# Patient Record
Sex: MALE | Race: WHITE | Employment: FULL TIME | URBAN - METROPOLITAN AREA
[De-identification: names, ages, dates, MRNs, and addresses within clinical notes are randomized per-mention and may not be internally consistent; named-entity substitution may affect disease eponyms.]

---

## 2017-03-10 PROBLEM — E29.1 HYPOGONADISM IN MALE: Status: ACTIVE | Noted: 2017-03-10

## 2017-03-28 PROBLEM — R68.82 DIMINISHED LIBIDO: Status: ACTIVE | Noted: 2017-03-28

## 2017-08-22 PROBLEM — R68.82 DIMINISHED LIBIDO: Status: RESOLVED | Noted: 2017-03-28 | Resolved: 2017-08-22

## 2018-03-19 PROBLEM — N52.2 DRUG-INDUCED ERECTILE DYSFUNCTION: Status: ACTIVE | Noted: 2018-03-19

## 2018-09-26 PROBLEM — L81.9 COLOR CHANGE IN PIGMENTED SKIN LESION: Status: ACTIVE | Noted: 2018-09-26

## 2018-09-26 PROBLEM — R06.83 SNORING: Status: ACTIVE | Noted: 2018-09-26

## 2018-10-09 ENCOUNTER — HOSPITAL ENCOUNTER (OUTPATIENT)
Dept: SLEEP MEDICINE | Age: 43
Discharge: HOME | End: 2018-10-09
Payer: COMMERCIAL

## 2018-10-09 PROCEDURE — 95810 POLYSOM 6/> YRS 4/> PARAM: CPT

## 2019-03-19 PROBLEM — Z79.890 ENCOUNTER FOR MONITORING TESTOSTERONE REPLACEMENT THERAPY: Status: ACTIVE | Noted: 2019-03-19

## 2019-03-19 PROBLEM — Z51.81 ENCOUNTER FOR MONITORING TESTOSTERONE REPLACEMENT THERAPY: Status: ACTIVE | Noted: 2019-03-19

## 2019-04-30 PROBLEM — G47.33 OSA (OBSTRUCTIVE SLEEP APNEA): Status: ACTIVE | Noted: 2019-04-30

## 2019-04-30 PROBLEM — Z72.821 INADEQUATE SLEEP HYGIENE: Status: ACTIVE | Noted: 2019-04-30

## 2019-09-17 PROBLEM — L81.9 COLOR CHANGE IN PIGMENTED SKIN LESION: Status: RESOLVED | Noted: 2018-09-26 | Resolved: 2019-09-17

## 2019-09-17 PROBLEM — F10.90 HEAVY ALCOHOL CONSUMPTION: Status: ACTIVE | Noted: 2019-09-17

## 2019-09-21 PROBLEM — Z79.899 HIGH RISK MEDICATION USE: Status: ACTIVE | Noted: 2019-09-21

## 2019-12-13 PROBLEM — M25.522 LEFT ELBOW PAIN: Status: ACTIVE | Noted: 2019-12-13

## 2019-12-13 PROBLEM — Z79.899 HIGH RISK MEDICATION USE: Status: RESOLVED | Noted: 2019-09-21 | Resolved: 2019-12-13

## 2020-03-18 PROBLEM — Z28.21 REFUSED INFLUENZA VACCINE: Status: ACTIVE | Noted: 2020-03-18

## 2020-03-24 PROBLEM — Z13.1 SCREENING FOR DIABETES MELLITUS: Status: ACTIVE | Noted: 2020-03-24

## 2020-03-24 PROBLEM — M21.922: Status: ACTIVE | Noted: 2020-03-24

## 2020-04-23 PROBLEM — Z13.1 SCREENING FOR DIABETES MELLITUS: Status: RESOLVED | Noted: 2020-03-24 | Resolved: 2020-04-23

## 2020-07-14 RX ORDER — SODIUM CHLORIDE 0.9 % (FLUSH) 0.9 %
5-40 SYRINGE (ML) INJECTION AS NEEDED
Status: CANCELLED | OUTPATIENT
Start: 2020-07-14

## 2020-07-14 RX ORDER — SODIUM CHLORIDE 0.9 % (FLUSH) 0.9 %
5-40 SYRINGE (ML) INJECTION EVERY 8 HOURS
Status: CANCELLED | OUTPATIENT
Start: 2020-07-14

## 2020-07-18 PROBLEM — Z13.1 SCREENING FOR DIABETES MELLITUS: Status: RESOLVED | Noted: 2020-03-24 | Resolved: 2020-07-18

## 2020-07-20 NOTE — H&P
Date of Service: 2020-07-06  Work Status:  ????? Allergies:????? 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 He is bothered by contractures around his left elbow that prevents full flexion and full extension. He has had occasional catching sensation but on questioning it doesn't sound as though his had true locking. The elbow seems to get stiff at rest or at night. He had had a left both bone forearm fracture many years ago. He had ORIF of the.    PE: The patient has decreased range of motion on the left as compared to the right. He has measured range from 30  to 125  P/S a 75/90. CT scan: The patient had a CT done at [address]  on 6/26/20. I've reviewed the images by Internet as well as the radiologist written report. It shows the patient to have degenerative changes of the elbow with hypertrophy of the anterior and posterior aspects of the trochlea as well as a prominent olecranon osteophyte with fragmentation of this. There is also hypertrophy of the coronoid. The joint cartilages are in good condition. There is soft tissue tension thickening around the extensor soft tissues near the olecranon but no definite bursitis. No loose bodies were noted. Disposition: The problem is discussed with the patient. He wishes to go ahead with surgery that has been scheduled for a 7/17/20. We'll began with inspection of the anterior part of the joint probably shortening the tip of the coronoid and doing a capsulotomy. If this does not give enough for flexion or extension and then he'll need exam of the posterior part of the elbow possibly through the same incision or possibly a secondary incision. He understands that the goal is to obtain about 50% improvement.   Arthrotomy was made some therapy and probably a dynamic splint afterwards. He wishes to proceed.

## 2020-07-23 ENCOUNTER — ANESTHESIA EVENT (OUTPATIENT)
Dept: SURGERY | Age: 45
End: 2020-07-23
Payer: COMMERCIAL

## 2020-07-24 ENCOUNTER — ANESTHESIA (OUTPATIENT)
Dept: SURGERY | Age: 45
End: 2020-07-24
Payer: COMMERCIAL

## 2020-07-24 ENCOUNTER — HOSPITAL ENCOUNTER (OUTPATIENT)
Age: 45
Setting detail: OUTPATIENT SURGERY
Discharge: HOME | End: 2020-07-24
Attending: ORTHOPAEDIC SURGERY | Admitting: ORTHOPAEDIC SURGERY
Payer: COMMERCIAL

## 2020-07-24 VITALS
SYSTOLIC BLOOD PRESSURE: 136 MMHG | WEIGHT: 219.8 LBS | RESPIRATION RATE: 14 BRPM | BODY MASS INDEX: 30.77 KG/M2 | TEMPERATURE: 97.8 F | HEART RATE: 80 BPM | DIASTOLIC BLOOD PRESSURE: 77 MMHG | HEIGHT: 71 IN | OXYGEN SATURATION: 94 %

## 2020-07-24 DIAGNOSIS — G89.18 POST-OP PAIN: Primary | ICD-10-CM

## 2020-07-24 PROCEDURE — 74011250636 HC RX REV CODE- 250/636: Performed by: NURSE ANESTHETIST, CERTIFIED REGISTERED

## 2020-07-24 PROCEDURE — 77030018836 HC SOL IRR NACL ICUM -A: Performed by: ORTHOPAEDIC SURGERY

## 2020-07-24 PROCEDURE — 77030000032 HC CUF TRNQT ZIMM -B: Performed by: ORTHOPAEDIC SURGERY

## 2020-07-24 PROCEDURE — 76942 ECHO GUIDE FOR BIOPSY: CPT | Performed by: ORTHOPAEDIC SURGERY

## 2020-07-24 PROCEDURE — 76060000034 HC ANESTHESIA 1.5 TO 2 HR: Performed by: ORTHOPAEDIC SURGERY

## 2020-07-24 PROCEDURE — 74011250636 HC RX REV CODE- 250/636

## 2020-07-24 PROCEDURE — 77030031139 HC SUT VCRL2 J&J -A: Performed by: ORTHOPAEDIC SURGERY

## 2020-07-24 PROCEDURE — 77030040361 HC SLV COMPR DVT MDII -B: Performed by: ORTHOPAEDIC SURGERY

## 2020-07-24 PROCEDURE — 77030002933 HC SUT MCRYL J&J -A: Performed by: ORTHOPAEDIC SURGERY

## 2020-07-24 PROCEDURE — 77030003602 HC NDL NRV BLK BBMI -B: Performed by: ANESTHESIOLOGY

## 2020-07-24 PROCEDURE — 74011000250 HC RX REV CODE- 250: Performed by: NURSE ANESTHETIST, CERTIFIED REGISTERED

## 2020-07-24 PROCEDURE — 77030002966 HC SUT PDS J&J -A: Performed by: ORTHOPAEDIC SURGERY

## 2020-07-24 PROCEDURE — 77030010509 HC AIRWY LMA MSK TELE -A: Performed by: ANESTHESIOLOGY

## 2020-07-24 PROCEDURE — 76210000006 HC OR PH I REC 0.5 TO 1 HR: Performed by: ORTHOPAEDIC SURGERY

## 2020-07-24 PROCEDURE — 74011250636 HC RX REV CODE- 250/636: Performed by: ANESTHESIOLOGY

## 2020-07-24 PROCEDURE — 76010000149 HC OR TIME 1 TO 1.5 HR: Performed by: ORTHOPAEDIC SURGERY

## 2020-07-24 PROCEDURE — 76010010054 HC POST OP PAIN BLOCK: Performed by: ORTHOPAEDIC SURGERY

## 2020-07-24 PROCEDURE — 76210000020 HC REC RM PH II FIRST 0.5 HR: Performed by: ORTHOPAEDIC SURGERY

## 2020-07-24 PROCEDURE — 74011000250 HC RX REV CODE- 250: Performed by: ANESTHESIOLOGY

## 2020-07-24 RX ORDER — BUPIVACAINE HYDROCHLORIDE AND EPINEPHRINE 5; 5 MG/ML; UG/ML
INJECTION, SOLUTION EPIDURAL; INTRACAUDAL; PERINEURAL
Status: COMPLETED | OUTPATIENT
Start: 2020-07-24 | End: 2020-07-24

## 2020-07-24 RX ORDER — ONDANSETRON 8 MG/1
8 TABLET, ORALLY DISINTEGRATING ORAL
Qty: 12 TAB | Refills: 1 | Status: SHIPPED | OUTPATIENT
Start: 2020-07-24 | End: 2021-08-11 | Stop reason: ALTCHOICE

## 2020-07-24 RX ORDER — FENTANYL CITRATE 50 UG/ML
100 INJECTION, SOLUTION INTRAMUSCULAR; INTRAVENOUS ONCE
Status: DISCONTINUED | OUTPATIENT
Start: 2020-07-24 | End: 2020-07-24 | Stop reason: HOSPADM

## 2020-07-24 RX ORDER — ONDANSETRON 2 MG/ML
INJECTION INTRAMUSCULAR; INTRAVENOUS AS NEEDED
Status: DISCONTINUED | OUTPATIENT
Start: 2020-07-24 | End: 2020-07-24 | Stop reason: HOSPADM

## 2020-07-24 RX ORDER — LIDOCAINE HYDROCHLORIDE 20 MG/ML
INJECTION, SOLUTION EPIDURAL; INFILTRATION; INTRACAUDAL; PERINEURAL AS NEEDED
Status: DISCONTINUED | OUTPATIENT
Start: 2020-07-24 | End: 2020-07-24 | Stop reason: HOSPADM

## 2020-07-24 RX ORDER — OXYCODONE HYDROCHLORIDE 5 MG/1
10 TABLET ORAL
Status: DISCONTINUED | OUTPATIENT
Start: 2020-07-24 | End: 2020-07-24 | Stop reason: HOSPADM

## 2020-07-24 RX ORDER — MIDAZOLAM HYDROCHLORIDE 1 MG/ML
2 INJECTION, SOLUTION INTRAMUSCULAR; INTRAVENOUS
Status: DISCONTINUED | OUTPATIENT
Start: 2020-07-24 | End: 2020-07-24 | Stop reason: HOSPADM

## 2020-07-24 RX ORDER — NALOXONE HYDROCHLORIDE 0.4 MG/ML
0.1 INJECTION, SOLUTION INTRAMUSCULAR; INTRAVENOUS; SUBCUTANEOUS AS NEEDED
Status: DISCONTINUED | OUTPATIENT
Start: 2020-07-24 | End: 2020-07-24 | Stop reason: HOSPADM

## 2020-07-24 RX ORDER — SODIUM CHLORIDE, SODIUM LACTATE, POTASSIUM CHLORIDE, CALCIUM CHLORIDE 600; 310; 30; 20 MG/100ML; MG/100ML; MG/100ML; MG/100ML
100 INJECTION, SOLUTION INTRAVENOUS CONTINUOUS
Status: DISCONTINUED | OUTPATIENT
Start: 2020-07-24 | End: 2020-07-24 | Stop reason: HOSPADM

## 2020-07-24 RX ORDER — LIDOCAINE HYDROCHLORIDE 10 MG/ML
0.1 INJECTION INFILTRATION; PERINEURAL AS NEEDED
Status: DISCONTINUED | OUTPATIENT
Start: 2020-07-24 | End: 2020-07-24 | Stop reason: HOSPADM

## 2020-07-24 RX ORDER — DEXAMETHASONE SODIUM PHOSPHATE 4 MG/ML
INJECTION, SOLUTION INTRA-ARTICULAR; INTRALESIONAL; INTRAMUSCULAR; INTRAVENOUS; SOFT TISSUE AS NEEDED
Status: DISCONTINUED | OUTPATIENT
Start: 2020-07-24 | End: 2020-07-24 | Stop reason: HOSPADM

## 2020-07-24 RX ORDER — SODIUM CHLORIDE 0.9 % (FLUSH) 0.9 %
5-40 SYRINGE (ML) INJECTION AS NEEDED
Status: DISCONTINUED | OUTPATIENT
Start: 2020-07-24 | End: 2020-07-24 | Stop reason: HOSPADM

## 2020-07-24 RX ORDER — ROPIVACAINE HYDROCHLORIDE 5 MG/ML
INJECTION, SOLUTION EPIDURAL; INFILTRATION; PERINEURAL
Status: COMPLETED | OUTPATIENT
Start: 2020-07-24 | End: 2020-07-24

## 2020-07-24 RX ORDER — DIPHENHYDRAMINE HYDROCHLORIDE 50 MG/ML
12.5 INJECTION, SOLUTION INTRAMUSCULAR; INTRAVENOUS
Status: DISCONTINUED | OUTPATIENT
Start: 2020-07-24 | End: 2020-07-24 | Stop reason: HOSPADM

## 2020-07-24 RX ORDER — OXYCODONE HYDROCHLORIDE 5 MG/1
5 TABLET ORAL
Status: DISCONTINUED | OUTPATIENT
Start: 2020-07-24 | End: 2020-07-24 | Stop reason: HOSPADM

## 2020-07-24 RX ORDER — SODIUM CHLORIDE 0.9 % (FLUSH) 0.9 %
5-40 SYRINGE (ML) INJECTION EVERY 8 HOURS
Status: DISCONTINUED | OUTPATIENT
Start: 2020-07-24 | End: 2020-07-24 | Stop reason: HOSPADM

## 2020-07-24 RX ORDER — KETOROLAC TROMETHAMINE 10 MG/1
10 TABLET, FILM COATED ORAL EVERY 6 HOURS
Qty: 15 TAB | Refills: 0 | Status: SHIPPED | OUTPATIENT
Start: 2020-07-24 | End: 2021-08-11 | Stop reason: ALTCHOICE

## 2020-07-24 RX ORDER — ONDANSETRON 2 MG/ML
4 INJECTION INTRAMUSCULAR; INTRAVENOUS ONCE
Status: DISCONTINUED | OUTPATIENT
Start: 2020-07-24 | End: 2020-07-24 | Stop reason: HOSPADM

## 2020-07-24 RX ORDER — PROPOFOL 10 MG/ML
INJECTION, EMULSION INTRAVENOUS AS NEEDED
Status: DISCONTINUED | OUTPATIENT
Start: 2020-07-24 | End: 2020-07-24 | Stop reason: HOSPADM

## 2020-07-24 RX ORDER — PROPOFOL 10 MG/ML
INJECTION, EMULSION INTRAVENOUS
Status: DISCONTINUED | OUTPATIENT
Start: 2020-07-24 | End: 2020-07-24 | Stop reason: HOSPADM

## 2020-07-24 RX ORDER — MIDAZOLAM HYDROCHLORIDE 1 MG/ML
2 INJECTION, SOLUTION INTRAMUSCULAR; INTRAVENOUS ONCE
Status: COMPLETED | OUTPATIENT
Start: 2020-07-24 | End: 2020-07-24

## 2020-07-24 RX ORDER — CEFAZOLIN SODIUM/WATER 2 G/20 ML
2 SYRINGE (ML) INTRAVENOUS ONCE
Status: COMPLETED | OUTPATIENT
Start: 2020-07-24 | End: 2020-07-24

## 2020-07-24 RX ORDER — ALBUTEROL SULFATE 0.83 MG/ML
2.5 SOLUTION RESPIRATORY (INHALATION) AS NEEDED
Status: DISCONTINUED | OUTPATIENT
Start: 2020-07-24 | End: 2020-07-24 | Stop reason: HOSPADM

## 2020-07-24 RX ORDER — HYDROMORPHONE HYDROCHLORIDE 2 MG/ML
0.5 INJECTION, SOLUTION INTRAMUSCULAR; INTRAVENOUS; SUBCUTANEOUS
Status: DISCONTINUED | OUTPATIENT
Start: 2020-07-24 | End: 2020-07-24 | Stop reason: HOSPADM

## 2020-07-24 RX ORDER — HYDROMORPHONE HYDROCHLORIDE 2 MG/1
2 TABLET ORAL
Qty: 30 TAB | Refills: 0 | Status: SHIPPED | OUTPATIENT
Start: 2020-07-24 | End: 2020-07-29

## 2020-07-24 RX ADMIN — Medication 2 G: at 12:43

## 2020-07-24 RX ADMIN — ROPIVACAINE HYDROCHLORIDE 10 ML: 150 INJECTION, SOLUTION EPIDURAL; INFILTRATION; PERINEURAL at 11:30

## 2020-07-24 RX ADMIN — MIDAZOLAM 2 MG: 1 INJECTION INTRAMUSCULAR; INTRAVENOUS at 11:26

## 2020-07-24 RX ADMIN — ONDANSETRON 4 MG: 2 INJECTION INTRAMUSCULAR; INTRAVENOUS at 13:30

## 2020-07-24 RX ADMIN — PROPOFOL 300 MG: 10 INJECTION, EMULSION INTRAVENOUS at 12:58

## 2020-07-24 RX ADMIN — PROPOFOL 100 MCG/KG/MIN: 10 INJECTION, EMULSION INTRAVENOUS at 12:49

## 2020-07-24 RX ADMIN — BUPIVACAINE HYDROCHLORIDE AND EPINEPHRINE BITARTRATE 20 ML: 5; .005 INJECTION, SOLUTION EPIDURAL; INTRACAUDAL; PERINEURAL at 11:30

## 2020-07-24 RX ADMIN — LIDOCAINE HYDROCHLORIDE 30 MG: 20 INJECTION, SOLUTION EPIDURAL; INFILTRATION; INTRACAUDAL; PERINEURAL at 12:47

## 2020-07-24 RX ADMIN — DEXAMETHASONE SODIUM PHOSPHATE 4 MG: 4 INJECTION, SOLUTION INTRAMUSCULAR; INTRAVENOUS at 13:28

## 2020-07-24 RX ADMIN — SODIUM CHLORIDE, SODIUM LACTATE, POTASSIUM CHLORIDE, AND CALCIUM CHLORIDE 100 ML/HR: 600; 310; 30; 20 INJECTION, SOLUTION INTRAVENOUS at 11:17

## 2020-07-24 RX ADMIN — PROPOFOL 40 MG: 10 INJECTION, EMULSION INTRAVENOUS at 12:47

## 2020-07-24 NOTE — BRIEF OP NOTE
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 Procedure(s):  LEFT ELBOW DEBRIDEMENT W. ANTERIOR AND POSTERIOR SHERIF DEBRIDEMENT/ EXCISION OLECRANON BURSA     Surgeon(s): Zina Larsen MD    Surgical Assistant: None    Anesthesia: Regional     Estimated Blood Loss (mL): less than 50     Complications: None    Specimens: * No specimens in log *     Implants: * No implants in log *    Drains: * No LDAs found *    Findings: Arthritis of the elbow with flexion contracture.     Electronically Signed by Carlene Morales MD on 7/24/2020 at 2:30 PM

## 2020-07-24 NOTE — DISCHARGE INSTRUCTIONS
POST OP INSTRUCTIONS    Has appt for 8/3/20 at 1:50 PM at the [facility] [phone]. Ice and elevate surgical extremity. May remove the Ace wraps in 48 hours, leave the Honey comb dressing on and shower. Movement: As tolerated. ACTIVITY  · As tolerated and as directed by your doctor. · Bathe or shower as directed by your doctor. DIET  · Clear liquids until no nausea or vomiting; then light diet for the first day. · Advance to regular diet on second day, unless your doctor orders otherwise. · If nausea and vomiting continues, call your doctor. PAIN  · Take pain medication as directed by your doctor. · Call your doctor if pain is NOT relieved by medication. · DO NOT take aspirin of blood thinners unless directed by your doctor. DRESSING CARE       CALL YOUR DOCTOR IF   · Excessive bleeding that does not stop after holding pressure over the area  · Temperature of 101 degrees F or above  · Excessive redness, swelling or bruising, and/ or green or yellow, smelly discharge from incision    AFTER ANESTHESIA   · For the first 24 hours: DO NOT Drive, Drink alcoholic beverages, or Make important decisions. · Be aware of dizziness following anesthesia and while taking pain medication. APPOINTMENT DATE/ TIME    YOUR DOCTOR'S PHONE NUMBER       DISCHARGE SUMMARY from Nurse    PATIENT INSTRUCTIONS:    After general anesthesia or intravenous sedation, for 24 hours or while taking prescription Narcotics:  · Limit your activities  · Do not drive and operate hazardous machinery  · Do not make important personal or business decisions  · Do  not drink alcoholic beverages  · If you have not urinated within 8 hours after discharge, please contact your surgeon on call. *  Please give a list of your current medications to your Primary Care Provider.     *  Please update this list whenever your medications are discontinued, doses are changed, or new medications (including over-the-counter products) are added. *  Please carry medication information at all times in case of emergency situations. These are general instructions for a healthy lifestyle:    No smoking/ No tobacco products/ Avoid exposure to second hand smoke    Surgeon General's Warning:  Quitting smoking now greatly reduces serious risk to your health. Obesity, smoking, and sedentary lifestyle greatly increases your risk for illness    A healthy diet, regular physical exercise & weight monitoring are important for maintaining a healthy lifestyle    You may be retaining fluid if you have a history of heart failure or if you experience any of the following symptoms:  Weight gain of 3 pounds or more overnight or 5 pounds in a week, increased swelling in our hands or feet or shortness of breath while lying flat in bed. Please call your doctor as soon as you notice any of these symptoms; do not wait until your next office visit. Recognize signs and symptoms of STROKE:    F-face looks uneven    A-arms unable to move or move unevenly    S-speech slurred or non-existent    T-time-call 911 as soon as signs and symptoms begin-DO NOT go       Back to bed or wait to see if you get better-TIME IS BRAIN. Advance Care Planning  People with COVID-19 may have no symptoms, mild symptoms, such as fever, cough, and shortness of breath or they may have more severe illness, developing severe and fatal pneumonia. As a result, Advance Care Planning with attention to naming a health care decision maker (someone you trust to make healthcare decisions for you if you could not speak for yourself) and sharing other health care preferences is important BEFORE a possible health crisis. Please contact your Primary Care Provider to discuss Advance Care Planning.      Preventing the Spread of Coronavirus Disease 2019 in Homes and Residential Communities  For the most recent information go to [employer]    Prevention steps for People with confirmed or suspected COVID-19 (including persons under investigation) who do not need to be hospitalized  and   People with confirmed COVID-19 who were hospitalized and determined to be medically stable to go home    Your healthcare provider and public health staff will evaluate whether you can be cared for at home. If it is determined that you do not need to be hospitalized and can be isolated at home, you will be monitored by staff from your local or state health department. You should follow the prevention steps below until a healthcare provider or local or state health department says you can return to your normal activities. Stay home except to get medical care  People who are mildly ill with COVID-19 are able to isolate at home during their illness. You should restrict activities outside your home, except for getting medical care. Do not go to work, school, or public areas. Avoid using public transportation, ride-sharing, or taxis. Separate yourself from other people and animals in your home  People: As much as possible, you should stay in a specific room and away from other people in your home. Also, you should use a separate bathroom, if available. Animals: You should restrict contact with pets and other animals while you are sick with COVID-19, just like you would around other people. Although there have not been reports of pets or other animals becoming sick with COVID-19, it is still recommended that people sick with COVID-19 limit contact with animals until more information is known about the virus. When possible, have another member of your household care for your animals while you are sick. If you are sick with COVID-19, avoid contact with your pet, including petting, snuggling, being kissed or licked, and sharing food.  If you must care for your pet or be around animals while you are sick, wash your hands before and after you interact with pets and wear a facemask. Call ahead before visiting your doctor  If you have a medical appointment, call the healthcare provider and tell them that you have or may have COVID-19. This will help the healthcare providers office take steps to keep other people from getting infected or exposed. Wear a facemask  You should wear a facemask when you are around other people (e.g., sharing a room or vehicle) or pets and before you enter a healthcare providers office. If you are not able to wear a facemask (for example, because it causes trouble breathing), then people who live with you should not stay in the same room with you, or they should wear a facemask if they enter your room. Cover your coughs and sneezes  Cover your mouth and nose with a tissue when you cough or sneeze. Throw used tissues in a lined trash can. Immediately wash your hands with soap and water for at least 20 seconds or, if soap and water are not available, clean your hands with an alcohol-based hand  that contains at least 60% alcohol. Clean your hands often  Wash your hands often with soap and water for at least 20 seconds, especially after blowing your nose, coughing, or sneezing; going to the bathroom; and before eating or preparing food. If soap and water are not readily available, use an alcohol-based hand  with at least 60% alcohol, covering all surfaces of your hands and rubbing them together until they feel dry. Soap and water are the best option if hands are visibly dirty. Avoid touching your eyes, nose, and mouth with unwashed hands. Avoid sharing personal household items  You should not share dishes, drinking glasses, cups, eating utensils, towels, or bedding with other people or pets in your home. After using these items, they should be washed thoroughly with soap and water.   Clean all high-touch surfaces everyday  High touch surfaces include counters, tabletops, doorknobs, bathroom fixtures, toilets, phones, keyboards, tablets, and bedside tables. Also, clean any surfaces that may have blood, stool, or body fluids on them. Use a household cleaning spray or wipe, according to the label instructions. Labels contain instructions for safe and effective use of the cleaning product including precautions you should take when applying the product, such as wearing gloves and making sure you have good ventilation during use of the product. Monitor your symptoms  Seek prompt medical attention if your illness is worsening (e.g., difficulty breathing). Before seeking care, call your healthcare provider and tell them that you have, or are being evaluated for, COVID-19. Put on a facemask before you enter the facility. These steps will help the healthcare providers office to keep other people in the office or waiting room from getting infected or exposed. Ask your healthcare provider to call the local or state health department. Persons who are placed under active monitoring or facilitated self-monitoring should follow instructions provided by their local health department or occupational health professionals, as appropriate. When working with your local health department check their available hours. If you have a medical emergency and need to call 911, notify the dispatch personnel that you have, or are being evaluated for COVID-19. If possible, put on a facemask before emergency medical services arrive. Discontinuing home isolation  Patients with confirmed COVID-19 should remain under home isolation precautions until the risk of secondary transmission to others is thought to be low. The decision to discontinue home isolation precautions should be made on a case-by-case basis, in consultation with healthcare providers and state and local health departments.

## 2020-07-24 NOTE — OP NOTES
[facility]  OPERATIVE REPORT    Name:  Harley Haider  MR#:  [id number]  :  1975  ACCOUNT #:  [de-identified]  DATE OF SERVICE:  2020    PREOPERATIVE DIAGNOSES:  1. Traumatic arthritis of the left elbow. 2.  Contractures of the left elbow. POSTOPERATIVE DIAGNOSES:  1. Traumatic arthritis of the left elbow. 2.  Contractures of the left elbow. PROCEDURE PERFORMED:  1. Arthrotomy and debridement of bone and synovium of the left elbow. 2.  Radical capsular excision and release of contractures of the left elbow. SURGEON:  Waldemar Ch MD    ASSISTANT:  None. ANESTHESIA:  Brachial plexus block. COMPLICATIONS:  None. SPECIMENS REMOVED:  None. IMPLANTS:  None. ESTIMATED BLOOD LOSS:  Less than 50 mL. PROCEDURE:  The patient had a preoperative range of motion from about 35 degrees to 125 degrees of the left elbow. He was given a brachial plexus block in the preop area and taken to the operating room and positioned in the supine position with his arm outstretched on an arm board. The patient had been given IV Ancef prophylactically. The left upper extremity was then prepped with ChloraPrep and draped as a sterile field. A time-out was held identifying the patient, surgeon, procedure, and operative site. An Esmarch bandage was used to exsanguinate the extremity of blood and a pneumatic tourniquet inflated to 250 mmHg around the upper arm. A dorsal lateral incision was made from just posterior to the lateral epicondyle across the anterolateral part of the elbow joint. The incision was carried down through the skin and subcutaneous tissues. Small bleeders were electrocoagulated. A split was made in the anterior third of the muscles of the conjoined tendon and carried down into the joint. When the joint capsule itself was opened, there was a gush of clear yellowish fluid that was suctioned out and dried.   No loose bodies were encountered during the procedure within the joint. Exam showed there to be thickening of the anterior capsule. This was released sharply by passing a [place]mont elevator both outside the joint as well as beneath the joint. The capsule was released. There was noted to be spurring around the condyles and a rongeur was used to remove some of this that prevented some full flexion, I believe, by impinging on the radial head and coronoid process. The end of the coronoid did not feel to be markedly elongated and was left alone. This allowed the patient to have almost full extension of the elbow at that point. Next, a posterior lateral incision was made along the lateral aspect of the triceps and carried down through skin and subcutaneous tissues. Again, small bleeders were electrocoagulated. Soft tissue was elevated back further posteriorly and then a longitudinal split made in the mid part of the triceps. An elevator and self-retaining retractors were used for visualization of the posterior aspect of the joint. There was some fibrotic debris in the olecranon fossa that was removed with a synovial rongeur. There was also some bone fragments in the distal triceps in the region of the longitudinal split that had been made and these were sharply excised. Some of the posterior olecranon bursa was also excised. This allowed better range of motion. I was able to completely extend the elbow to 0 and to flex to about 140-145 degrees of flexion. The joint was irrigated then with saline. The splits in the triceps as well as those in the conjoined tendon were repaired with 2-0 PDS. The subcutaneous layers of both incisions were closed with inverted sutures of 4-0 Monocryl. Steri-Strips were applied with Mastisol adhesive followed by a honeycomb dressing. Wrapping the end of Ace wraps from the palm to the upper arm was done for compression. The tourniquet was deflated. The patient tolerated the procedure well.       [address] [facility] 165, MD [id number]  D:  07/24/2020 14:42  T:  07/24/2020 15:12  JOB #:  [id number]  CC:  [address] [place] [address]

## 2020-07-24 NOTE — ANESTHESIA POSTPROCEDURE EVALUATION
Procedure(s):  LEFT ELBOW DEBRIDEMENT W. ANTERIOR AND POSTERIOR SHERIF DEBRIDEMENT/ EXCISION OLECRANON BURSAE BRACHIO PLEXUS.    total IV anesthesia    Anesthesia Post Evaluation      Multimodal analgesia: multimodal analgesia used between 6 hours prior to anesthesia start to PACU discharge  Patient location during evaluation: PACU  Patient participation: complete - patient participated  Level of consciousness: awake and alert  Pain score: 0  Pain management: adequate  Airway patency: patent  Anesthetic complications: no  Cardiovascular status: acceptable and hemodynamically stable  Respiratory status: acceptable  Hydration status: acceptable  Post anesthesia nausea and vomiting:  none  Final Post Anesthesia Temperature Assessment:  Normothermia (36.0-37.5 degrees C)      INITIAL Post-op Vital signs:   Vitals Value Taken Time   /87 7/24/2020  2:38 PM   Temp 36.6 °C (97.8 °F) 7/24/2020  2:16 PM   Pulse 80 7/24/2020  2:40 PM   Resp 16 7/24/2020  2:16 PM   SpO2 92 % 7/24/2020  2:40 PM   Vitals shown include unvalidated device data.

## 2020-07-24 NOTE — ANESTHESIA PROCEDURE NOTES
Peripheral Block    Start time: 7/24/2020 11:26 AM  End time: 7/24/2020 11:30 AM  Performed by: Diann Mar MD  Authorized by: Diann Mar MD       Pre-procedure:    Indications: at surgeon's request and post-op pain management    Preanesthetic Checklist: patient identified, risks and benefits discussed, site marked, timeout performed, anesthesia consent given and patient being monitored    Timeout Time: 11:26          Block Type:   Block Type:  Supraclavicular  Laterality:  Left  Monitoring:  Standard ASA monitoring, continuous pulse ox, frequent vital sign checks, heart rate, oxygen and responsive to questions  Injection Technique:  Single shot  Procedures: ultrasound guided    Patient Position: supine  Prep: chlorhexidine    Needle Type:  Stimuplex  Needle Gauge:  21 G  Needle Localization:  Ultrasound guidance and anatomical landmarks    Assessment:  Number of attempts:  1  Injection Assessment:  Incremental injection every 5 mL, local visualized surrounding nerve on ultrasound, negative aspiration for blood, no paresthesia, no intravascular symptoms and ultrasound image on chart  Patient tolerance:  Patient tolerated the procedure well with no immediate complications

## 2020-07-24 NOTE — INTERVAL H&P NOTE
Update History & Physical    The Patient's History and Physical of July 6, 2020,   was reviewed with the patient and I examined the patient. There was no change. The surgical site was confirmed by the patient and me. Pt is alert and oriented. Chest: Clear w/o SOB. C/V:  RR&R    Plan:  The risk, benefits, expected outcome, and alternative to the recommended procedure have been discussed with the patient. Patient understands and wants to proceed with debridement and contractor release of the left elbow.     Electronically signed by Phillip Turner MD on 7/24/2020 at 12:04 PM

## 2021-09-02 ENCOUNTER — HOSPITAL ENCOUNTER (OUTPATIENT)
Dept: MRI IMAGING | Age: 46
Discharge: HOME | End: 2021-09-02
Attending: INTERNAL MEDICINE
Payer: COMMERCIAL

## 2021-09-02 DIAGNOSIS — E23.0 HYPOGONADOTROPIC HYPOGONADISM (HCC): ICD-10-CM

## 2021-09-02 DIAGNOSIS — E22.1 HYPERPROLACTINEMIA (HCC): ICD-10-CM

## 2021-09-02 PROCEDURE — 74011250636 HC RX REV CODE- 250/636: Performed by: INTERNAL MEDICINE

## 2021-09-02 PROCEDURE — A9576 INJ PROHANCE MULTIPACK: HCPCS | Performed by: INTERNAL MEDICINE

## 2021-09-02 PROCEDURE — 70553 MRI BRAIN STEM W/O & W/DYE: CPT

## 2021-09-02 RX ORDER — SODIUM CHLORIDE 0.9 % (FLUSH) 0.9 %
10 SYRINGE (ML) INJECTION
Status: COMPLETED | OUTPATIENT
Start: 2021-09-02 | End: 2021-09-02

## 2021-09-02 RX ADMIN — GADOTERIDOL 20 ML: 279.3 INJECTION, SOLUTION INTRAVENOUS at 21:13

## 2021-09-02 RX ADMIN — Medication 10 ML: at 21:13

## 2022-03-18 PROBLEM — G47.33 OSA (OBSTRUCTIVE SLEEP APNEA): Status: ACTIVE | Noted: 2019-04-30

## 2022-03-18 PROBLEM — E29.1 HYPOGONADISM IN MALE: Status: ACTIVE | Noted: 2017-03-10

## 2022-03-18 PROBLEM — Z28.21 REFUSED INFLUENZA VACCINE: Status: ACTIVE | Noted: 2020-03-18

## 2022-03-19 PROBLEM — N52.2 DRUG-INDUCED ERECTILE DYSFUNCTION: Status: ACTIVE | Noted: 2018-03-19

## 2022-03-19 PROBLEM — Z72.821 INADEQUATE SLEEP HYGIENE: Status: ACTIVE | Noted: 2019-04-30

## 2022-03-19 PROBLEM — M25.522 LEFT ELBOW PAIN: Status: ACTIVE | Noted: 2019-12-13

## 2022-03-19 PROBLEM — F10.90 HEAVY ALCOHOL CONSUMPTION: Status: ACTIVE | Noted: 2019-09-17

## 2022-03-19 PROBLEM — Z79.899 HIGH RISK MEDICATION USE: Status: ACTIVE | Noted: 2019-09-21

## 2022-03-20 PROBLEM — Z79.890 ENCOUNTER FOR MONITORING TESTOSTERONE REPLACEMENT THERAPY: Status: ACTIVE | Noted: 2019-03-19

## 2022-03-20 PROBLEM — Z51.81 ENCOUNTER FOR MONITORING TESTOSTERONE REPLACEMENT THERAPY: Status: ACTIVE | Noted: 2019-03-19

## 2022-03-20 PROBLEM — M21.922: Status: ACTIVE | Noted: 2020-03-24

## 2022-03-20 PROBLEM — R06.83 SNORING: Status: ACTIVE | Noted: 2018-09-26

## 2022-05-27 DIAGNOSIS — E23.0 HYPOGONADOTROPIC HYPOGONADISM (HCC): Primary | ICD-10-CM

## 2022-06-13 ENCOUNTER — TELEPHONE (OUTPATIENT)
Dept: FAMILY MEDICINE CLINIC | Facility: CLINIC | Age: 47
End: 2022-06-13

## 2022-06-13 DIAGNOSIS — E34.9 TESTOSTERONE DEFICIENCY: ICD-10-CM

## 2022-06-13 DIAGNOSIS — Z13.220 SCREENING CHOLESTEROL LEVEL: ICD-10-CM

## 2022-06-13 DIAGNOSIS — Z13.1 SCREENING FOR DIABETES MELLITUS: Primary | ICD-10-CM

## 2022-06-13 NOTE — TELEPHONE ENCOUNTER
----- Message from Moira Adler sent at 6/13/2022  2:23 PM EDT -----  Subject: Message to Provider    QUESTIONS  Information for Provider? pt has a annual physical appt schd for [id number] @   [phone] please call pt if needs to come in prior to do labs or can do after   appt   ---------------------------------------------------------------------------  --------------  [address]  What is the best way for the office to contact you? OK to leave message on   voicemail  Preferred Call Back Phone Number? [phone]  ---------------------------------------------------------------------------  --------------  SCRIPT ANSWERS  Relationship to Patient?  Self

## 2022-06-17 ENCOUNTER — NURSE ONLY (OUTPATIENT)
Dept: FAMILY MEDICINE CLINIC | Facility: CLINIC | Age: 47
End: 2022-06-17

## 2022-06-17 DIAGNOSIS — Z13.1 SCREENING FOR DIABETES MELLITUS: ICD-10-CM

## 2022-06-17 DIAGNOSIS — Z13.220 SCREENING CHOLESTEROL LEVEL: ICD-10-CM

## 2022-06-17 DIAGNOSIS — E34.9 TESTOSTERONE DEFICIENCY: ICD-10-CM

## 2022-06-17 LAB
ALBUMIN SERPL-MCNC: 4.1 G/DL (ref 3.5–5)
ALBUMIN/GLOB SERPL: 1.2 {RATIO} (ref 1.2–3.5)
ALP SERPL-CCNC: 59 U/L (ref 50–136)
ALT SERPL-CCNC: 22 U/L (ref 12–65)
ANION GAP SERPL CALC-SCNC: 8 MMOL/L (ref 7–16)
AST SERPL-CCNC: 14 U/L (ref 15–37)
BASOPHILS # BLD: 0 K/UL (ref 0–0.2)
BASOPHILS NFR BLD: 1 % (ref 0–2)
BILIRUB SERPL-MCNC: 0.5 MG/DL (ref 0.2–1.1)
BUN SERPL-MCNC: 12 MG/DL (ref 6–23)
CALCIUM SERPL-MCNC: 9.8 MG/DL (ref 8.3–10.4)
CHLORIDE SERPL-SCNC: 106 MMOL/L (ref 98–107)
CHOLEST SERPL-MCNC: 274 MG/DL
CO2 SERPL-SCNC: 25 MMOL/L (ref 21–32)
CREAT SERPL-MCNC: 1.1 MG/DL (ref 0.8–1.5)
DIFFERENTIAL METHOD BLD: NORMAL
EOSINOPHIL # BLD: 0.2 K/UL (ref 0–0.8)
EOSINOPHIL NFR BLD: 4 % (ref 0.5–7.8)
ERYTHROCYTE [DISTWIDTH] IN BLOOD BY AUTOMATED COUNT: 12.5 % (ref 11.9–14.6)
GLOBULIN SER CALC-MCNC: 3.3 G/DL (ref 2.3–3.5)
GLUCOSE SERPL-MCNC: 93 MG/DL (ref 65–100)
HCT VFR BLD AUTO: 42.8 % (ref 41.1–50.3)
HDLC SERPL-MCNC: 48 MG/DL (ref 40–60)
HDLC SERPL: 5.7 {RATIO}
HGB BLD-MCNC: 14.4 G/DL (ref 13.6–17.2)
IMM GRANULOCYTES # BLD AUTO: 0 K/UL (ref 0–0.5)
IMM GRANULOCYTES NFR BLD AUTO: 0 % (ref 0–5)
LDLC SERPL CALC-MCNC: 206.4 MG/DL
LYMPHOCYTES # BLD: 1.7 K/UL (ref 0.5–4.6)
LYMPHOCYTES NFR BLD: 35 % (ref 13–44)
MCH RBC QN AUTO: 31.3 PG (ref 26.1–32.9)
MCHC RBC AUTO-ENTMCNC: 33.6 G/DL (ref 31.4–35)
MCV RBC AUTO: 93 FL (ref 79.6–97.8)
MONOCYTES # BLD: 0.5 K/UL (ref 0.1–1.3)
MONOCYTES NFR BLD: 11 % (ref 4–12)
NEUTS SEG # BLD: 2.4 K/UL (ref 1.7–8.2)
NEUTS SEG NFR BLD: 49 % (ref 43–78)
NRBC # BLD: 0 K/UL (ref 0–0.2)
PLATELET # BLD AUTO: 212 K/UL (ref 150–450)
PMV BLD AUTO: 10.8 FL (ref 9.4–12.3)
POTASSIUM SERPL-SCNC: 4.3 MMOL/L (ref 3.5–5.1)
PROT SERPL-MCNC: 7.4 G/DL (ref 6.3–8.2)
RBC # BLD AUTO: 4.6 M/UL (ref 4.23–5.6)
SODIUM SERPL-SCNC: 139 MMOL/L (ref 138–145)
TRIGL SERPL-MCNC: 98 MG/DL (ref 35–150)
VLDLC SERPL CALC-MCNC: 19.6 MG/DL (ref 6–23)
WBC # BLD AUTO: 4.9 K/UL (ref 4.3–11.1)

## 2022-06-29 ENCOUNTER — PATIENT MESSAGE (OUTPATIENT)
Dept: FAMILY MEDICINE CLINIC | Facility: CLINIC | Age: 47
End: 2022-06-29

## 2022-06-29 RX ORDER — ROSUVASTATIN CALCIUM 20 MG/1
20 TABLET, COATED ORAL NIGHTLY
Qty: 30 TABLET | Refills: 3 | Status: SHIPPED | OUTPATIENT
Start: 2022-06-29 | End: 2022-07-01

## 2022-07-01 ENCOUNTER — OFFICE VISIT (OUTPATIENT)
Dept: FAMILY MEDICINE CLINIC | Facility: CLINIC | Age: 47
End: 2022-07-01
Payer: COMMERCIAL

## 2022-07-01 VITALS
WEIGHT: 210.6 LBS | SYSTOLIC BLOOD PRESSURE: 134 MMHG | RESPIRATION RATE: 16 BRPM | HEIGHT: 71 IN | BODY MASS INDEX: 29.48 KG/M2 | DIASTOLIC BLOOD PRESSURE: 80 MMHG

## 2022-07-01 DIAGNOSIS — E78.2 MIXED HYPERLIPIDEMIA: ICD-10-CM

## 2022-07-01 DIAGNOSIS — Z00.00 ANNUAL PHYSICAL EXAM: Primary | ICD-10-CM

## 2022-07-01 PROCEDURE — 99396 PREV VISIT EST AGE 40-64: CPT | Performed by: FAMILY MEDICINE

## 2022-07-01 ASSESSMENT — ENCOUNTER SYMPTOMS
NAUSEA: 0
ABDOMINAL PAIN: 0
DIARRHEA: 0
WHEEZING: 0
VOMITING: 0
SHORTNESS OF BREATH: 0
COUGH: 0

## 2022-07-01 ASSESSMENT — PATIENT HEALTH QUESTIONNAIRE - PHQ9
SUM OF ALL RESPONSES TO PHQ QUESTIONS 1-9: 0
1. LITTLE INTEREST OR PLEASURE IN DOING THINGS: 0
SUM OF ALL RESPONSES TO PHQ9 QUESTIONS 1 & 2: 0
SUM OF ALL RESPONSES TO PHQ QUESTIONS 1-9: 0
2. FEELING DOWN, DEPRESSED OR HOPELESS: 0
SUM OF ALL RESPONSES TO PHQ QUESTIONS 1-9: 0
SUM OF ALL RESPONSES TO PHQ QUESTIONS 1-9: 0

## 2022-07-01 NOTE — PROGRESS NOTES
replacement therapy    Snoring     Past Medical History:   Diagnosis Date    GERD (gastroesophageal reflux disease)     Hypercholesterolemia     Hyperinsulinemia     Hyperprolactinemia (HCC)     Hypogonadotropic hypogonadism ([address]     Major depression     Obstructive sleep apnea on CPAP     Osteoarthritis      Past Surgical History:   Procedure Laterality Date    HX OPEN REDUCTION INTERNAL FIXATION      left arm    ORTHOPEDIC SURGERY  2020    elbow arthroscopy     Social History     Tobacco Use    Smoking status: Former Smoker     Packs/day: 0.25     Start date: 1995     Quit date: 2000     Years since quittin.8    Smokeless tobacco: Never Used   Substance Use Topics    Alcohol use: Yes     Alcohol/week: 12.0 - 20.0 standard drinks         Review of Systems   Constitutional: Negative for chills, fatigue and fever. Respiratory: Negative for cough, shortness of breath and wheezing. Cardiovascular: Negative for chest pain, palpitations and leg swelling. Faster heart rate   Gastrointestinal: Negative for abdominal pain, diarrhea, nausea and vomiting. Genitourinary: Negative for dysuria. Neurological: Negative for dizziness and headaches. Psychiatric/Behavioral: Negative for sleep disturbance. The patient is not nervous/anxious. OBJECTIVE:  Vitals:    22 1458   BP: 134/80   Resp: 16   Weight: 210 lb 9.6 oz (95.5 kg)   Height: 5' 11\" (1.803 m)        Physical Exam  Constitutional:       Appearance: Normal appearance. HENT:      Head: Normocephalic. Neck:      Vascular: No carotid bruit. Cardiovascular:      Rate and Rhythm: Normal rate and regular rhythm. Pulses: Normal pulses. Heart sounds: Normal heart sounds. Pulmonary:      Effort: Pulmonary effort is normal.      Breath sounds: Normal breath sounds. Musculoskeletal:      Right lower leg: No edema. Left lower leg: No edema.    Neurological:      Mental Status: He is alert and

## 2022-09-14 ENCOUNTER — TELEPHONE (OUTPATIENT)
Dept: FAMILY MEDICINE CLINIC | Facility: CLINIC | Age: 47
End: 2022-09-14

## 2022-09-14 DIAGNOSIS — E29.1 HYPOGONADISM IN MALE: Primary | ICD-10-CM

## 2022-09-14 NOTE — TELEPHONE ENCOUNTER
States that Idalia prescribed him medication for testosterone. He is wondering if Dr. Jose Rafael Kidd can take over this medication for him instead. Please advise.

## 2022-09-16 NOTE — TELEPHONE ENCOUNTER
Placed order for Testosterone to be included with upcoming labs. Has appt scheduled soon with Dr. Brina Hicks. May need one refill to last until this appointment, or he'll have been without for a couple of days.

## 2022-09-28 ENCOUNTER — NURSE ONLY (OUTPATIENT)
Dept: FAMILY MEDICINE CLINIC | Facility: CLINIC | Age: 47
End: 2022-09-28

## 2022-09-28 DIAGNOSIS — E29.1 HYPOGONADISM IN MALE: ICD-10-CM

## 2022-09-28 DIAGNOSIS — E78.2 MIXED HYPERLIPIDEMIA: ICD-10-CM

## 2022-09-29 LAB
CHOLEST SERPL-MCNC: 231 MG/DL
HDLC SERPL-MCNC: 42 MG/DL (ref 40–60)
HDLC SERPL: 5.5 {RATIO}
LDLC SERPL CALC-MCNC: 167.8 MG/DL
TRIGL SERPL-MCNC: 106 MG/DL (ref 35–150)
VLDLC SERPL CALC-MCNC: 21.2 MG/DL (ref 6–23)

## 2022-10-01 LAB
TESTOST FREE SERPL-MCNC: 17.3 PG/ML (ref 6.8–21.5)
TESTOST SERPL-MCNC: 432 NG/DL (ref 264–916)

## 2022-10-03 ENCOUNTER — OFFICE VISIT (OUTPATIENT)
Dept: FAMILY MEDICINE CLINIC | Facility: CLINIC | Age: 47
End: 2022-10-03
Payer: COMMERCIAL

## 2022-10-03 VITALS
HEIGHT: 71 IN | BODY MASS INDEX: 28.64 KG/M2 | WEIGHT: 204.6 LBS | SYSTOLIC BLOOD PRESSURE: 132 MMHG | RESPIRATION RATE: 16 BRPM | DIASTOLIC BLOOD PRESSURE: 80 MMHG

## 2022-10-03 DIAGNOSIS — E29.1 TESTOSTERONE DEFICIENCY IN MALE: Primary | ICD-10-CM

## 2022-10-03 DIAGNOSIS — E78.2 MIXED HYPERLIPIDEMIA: ICD-10-CM

## 2022-10-03 PROCEDURE — 99214 OFFICE O/P EST MOD 30 MIN: CPT | Performed by: FAMILY MEDICINE

## 2022-10-03 ASSESSMENT — PATIENT HEALTH QUESTIONNAIRE - PHQ9
2. FEELING DOWN, DEPRESSED OR HOPELESS: 3
SUM OF ALL RESPONSES TO PHQ QUESTIONS 1-9: 6
7. TROUBLE CONCENTRATING ON THINGS, SUCH AS READING THE NEWSPAPER OR WATCHING TELEVISION: 0
4. FEELING TIRED OR HAVING LITTLE ENERGY: 0
SUM OF ALL RESPONSES TO PHQ QUESTIONS 1-9: 6
5. POOR APPETITE OR OVEREATING: 0
SUM OF ALL RESPONSES TO PHQ QUESTIONS 1-9: 6
1. LITTLE INTEREST OR PLEASURE IN DOING THINGS: 0
9. THOUGHTS THAT YOU WOULD BE BETTER OFF DEAD, OR OF HURTING YOURSELF: 0
3. TROUBLE FALLING OR STAYING ASLEEP: 0
10. IF YOU CHECKED OFF ANY PROBLEMS, HOW DIFFICULT HAVE THESE PROBLEMS MADE IT FOR YOU TO DO YOUR WORK, TAKE CARE OF THINGS AT HOME, OR GET ALONG WITH OTHER PEOPLE: 1
SUM OF ALL RESPONSES TO PHQ9 QUESTIONS 1 & 2: 3
SUM OF ALL RESPONSES TO PHQ QUESTIONS 1-9: 6
6. FEELING BAD ABOUT YOURSELF - OR THAT YOU ARE A FAILURE OR HAVE LET YOURSELF OR YOUR FAMILY DOWN: 3
8. MOVING OR SPEAKING SO SLOWLY THAT OTHER PEOPLE COULD HAVE NOTICED. OR THE OPPOSITE, BEING SO FIGETY OR RESTLESS THAT YOU HAVE BEEN MOVING AROUND A LOT MORE THAN USUAL: 0

## 2022-10-22 ASSESSMENT — ENCOUNTER SYMPTOMS
VOMITING: 0
NAUSEA: 0
WHEEZING: 0
COUGH: 0
DIARRHEA: 0
SHORTNESS OF BREATH: 0
ABDOMINAL PAIN: 0

## 2022-10-22 NOTE — PROGRESS NOTES
PROGRESS NOTE    SUBJECTIVE:   Dara Salinas is a 41 y.o. male seen for a follow up visit regarding [unfilled]    42 y/o CM here to go over labs. He has testosterone deficiency and HLD. He is on Clomiphene for the testosterone deficiency and Omega 3 for HLD. Lab Results   Component Value Date    CHOL 231 (H) 09/28/2022    CHOL 274 (H) 06/17/2022    CHOL 247 (H) 12/14/2020     Lab Results   Component Value Date    TRIG 106 09/28/2022    TRIG 98 06/17/2022    TRIG 188 (H) 12/14/2020     Lab Results   Component Value Date    HDL 42 09/28/2022    HDL 48 06/17/2022    HDL 49 12/14/2020     Lab Results   Component Value Date    LDLCALC 167.8 (H) 09/28/2022    LDLCALC 206.4 (H) 06/17/2022    LDLCALC 163 (H) 12/14/2020     Lab Results   Component Value Date    LABVLDL 21.2 09/28/2022    LABVLDL 19.6 06/17/2022    LABVLDL 52 (H) 06/18/2020    VLDL 35 12/14/2020     Lab Results   Component Value Date    CHOLHDLRATIO 5.5 09/28/2022    CHOLHDLRATIO 5.7 06/17/2022     Testosterone was 432. Past Medical History, Past Surgical History, Family history, Social History, and Medications were all reviewed with the patient today and updated as necessary. Current Outpatient Medications   Medication Sig Dispense Refill    Omega-3 Fatty Acids (FISH OIL PO) Take by mouth      clomiPHENE (CLOMID) 50 MG tablet Take 1 tablet by mouth daily 30 tablet 11    Multiple Vitamin (MULTIVITAMIN PO) Take by mouth       No current facility-administered medications for this visit.      No Known Allergies  Patient Active Problem List   Diagnosis    Chronic eczematous otitis externa of both ears    CHADWICK (obstructive sleep apnea)    Refused influenza vaccine    Hypogonadism in male    Hypercholesterolemia    Gastroesophageal reflux disease without esophagitis    Hypogonadotropic hypogonadism (HCC)    Obstructive sleep apnea on CPAP    Mild single current episode of major depressive disorder (HCC)    Inadequate sleep hygiene    High risk medication use    Heavy alcohol consumption    Left elbow pain    Hyperprolactinemia (HCC)    Drug-induced erectile dysfunction    Acquired deformity of left elbow    Class 1 obesity due to excess calories without serious comorbidity with body mass index (BMI) of 30.0 to 30.9 in adult    Encounter for monitoring testosterone replacement therapy    Snoring     Past Medical History:   Diagnosis Date    GERD (gastroesophageal reflux disease)     Hypercholesterolemia     Hyperinsulinemia     Hyperprolactinemia (HCC)     Hypogonadotropic hypogonadism (HCC)     Major depression     Obstructive sleep apnea on CPAP     Osteoarthritis      Past Surgical History:   Procedure Laterality Date    HX OPEN REDUCTION INTERNAL FIXATION      left arm    ORTHOPEDIC SURGERY  2020    elbow arthroscopy     Social History     Tobacco Use    Smoking status: Former     Packs/day: 0.25     Types: Cigarettes     Start date: 1995     Quit date: 2000     Years since quittin.1    Smokeless tobacco: Never   Substance Use Topics    Alcohol use: Yes     Alcohol/week: 12.0 - 20.0 standard drinks         Review of Systems   Constitutional:  Negative for chills, fatigue and fever. Respiratory:  Negative for cough, shortness of breath and wheezing. Cardiovascular:  Negative for chest pain, palpitations and leg swelling. Gastrointestinal:  Negative for abdominal pain, diarrhea, nausea and vomiting. Genitourinary:  Negative for dysuria. Neurological:  Negative for dizziness and headaches. Psychiatric/Behavioral:  Negative for sleep disturbance. The patient is not nervous/anxious. OBJECTIVE:  Vitals:    10/03/22 1636   BP: 132/80   Resp: 16   Weight: 204 lb 9.6 oz (92.8 kg)   Height: 5' 11\" (1.803 m)        Physical Exam  Constitutional:       Appearance: Normal appearance. HENT:      Head: Normocephalic. Neck:      Vascular: No carotid bruit.    Cardiovascular:      Rate and Rhythm: Normal rate and regular rhythm. Pulses: Normal pulses. Heart sounds: Normal heart sounds. Pulmonary:      Effort: Pulmonary effort is normal.      Breath sounds: Normal breath sounds. Musculoskeletal:      Right lower leg: No edema. Left lower leg: No edema. Neurological:      Mental Status: He is alert and oriented to person, place, and time. Psychiatric:         Mood and Affect: Mood normal.        Medical problems and test results were reviewed with the patient today. Recent Results (from the past 672 hour(s))   Testosterone, free, total    Collection Time: 09/28/22  8:18 AM   Result Value Ref Range    Testosterone 432 264 - 916 ng/dL    Testosterone, Free 17.3 6.8 - 21.5 pg/mL   Lipid Panel    Collection Time: 09/28/22  8:18 AM   Result Value Ref Range    Cholesterol, Total 231 (H) <200 MG/DL    Triglycerides 106 35 - 150 MG/DL    HDL 42 40 - 60 MG/DL    LDL Calculated 167.8 (H) <100 MG/DL    VLDL Cholesterol Calculated 21.2 6.0 - 23.0 MG/DL    Chol/HDL Ratio 5.5           ASSESSMENT and PLAN    Chance Sandra was seen today for follow-up and hyperlipidemia. Diagnoses and all orders for this visit:    Testosterone deficiency in male  -     clomiPHENE (CLOMID) 50 MG tablet; Take 1 tablet by mouth daily    Mixed hyperlipidemia    No fried foods. Limit eating out to once weekly. Limit saturated fats (butter, processed foods, kuhn). Red meat only 2x/month. Cont Omega 3. No follow-up provider specified.

## 2023-04-03 ENCOUNTER — OFFICE VISIT (OUTPATIENT)
Dept: FAMILY MEDICINE CLINIC | Facility: CLINIC | Age: 48
End: 2023-04-03
Payer: COMMERCIAL

## 2023-04-03 VITALS
RESPIRATION RATE: 16 BRPM | BODY MASS INDEX: 29.6 KG/M2 | DIASTOLIC BLOOD PRESSURE: 92 MMHG | WEIGHT: 211.4 LBS | HEIGHT: 71 IN | SYSTOLIC BLOOD PRESSURE: 138 MMHG | HEART RATE: 82 BPM | OXYGEN SATURATION: 99 %

## 2023-04-03 DIAGNOSIS — E34.9 TESTOSTERONE DEFICIENCY: Primary | ICD-10-CM

## 2023-04-03 DIAGNOSIS — E78.2 MIXED HYPERLIPIDEMIA: ICD-10-CM

## 2023-04-03 DIAGNOSIS — Z13.1 SCREENING FOR DIABETES MELLITUS: ICD-10-CM

## 2023-04-03 DIAGNOSIS — R35.1 NOCTURIA: ICD-10-CM

## 2023-04-03 DIAGNOSIS — Z11.4 ENCOUNTER FOR SCREENING FOR HIV: ICD-10-CM

## 2023-04-03 DIAGNOSIS — Z11.59 NEED FOR HEPATITIS C SCREENING TEST: ICD-10-CM

## 2023-04-03 PROCEDURE — 99214 OFFICE O/P EST MOD 30 MIN: CPT | Performed by: FAMILY MEDICINE

## 2023-04-03 SDOH — ECONOMIC STABILITY: HOUSING INSECURITY
IN THE LAST 12 MONTHS, WAS THERE A TIME WHEN YOU DID NOT HAVE A STEADY PLACE TO SLEEP OR SLEPT IN A SHELTER (INCLUDING NOW)?: NO

## 2023-04-03 SDOH — ECONOMIC STABILITY: INCOME INSECURITY: HOW HARD IS IT FOR YOU TO PAY FOR THE VERY BASICS LIKE FOOD, HOUSING, MEDICAL CARE, AND HEATING?: NOT HARD AT ALL

## 2023-04-03 SDOH — ECONOMIC STABILITY: FOOD INSECURITY: WITHIN THE PAST 12 MONTHS, YOU WORRIED THAT YOUR FOOD WOULD RUN OUT BEFORE YOU GOT MONEY TO BUY MORE.: NEVER TRUE

## 2023-04-03 SDOH — ECONOMIC STABILITY: FOOD INSECURITY: WITHIN THE PAST 12 MONTHS, THE FOOD YOU BOUGHT JUST DIDN'T LAST AND YOU DIDN'T HAVE MONEY TO GET MORE.: NEVER TRUE

## 2023-04-03 ASSESSMENT — PATIENT HEALTH QUESTIONNAIRE - PHQ9
SUM OF ALL RESPONSES TO PHQ QUESTIONS 1-9: 0
SUM OF ALL RESPONSES TO PHQ QUESTIONS 1-9: 0
2. FEELING DOWN, DEPRESSED OR HOPELESS: 0
1. LITTLE INTEREST OR PLEASURE IN DOING THINGS: 0
SUM OF ALL RESPONSES TO PHQ9 QUESTIONS 1 & 2: 0
SUM OF ALL RESPONSES TO PHQ QUESTIONS 1-9: 0
SUM OF ALL RESPONSES TO PHQ QUESTIONS 1-9: 0

## 2023-04-03 ASSESSMENT — ENCOUNTER SYMPTOMS
COUGH: 0
ABDOMINAL PAIN: 0
SHORTNESS OF BREATH: 0
VOMITING: 0
NAUSEA: 0
DIARRHEA: 0
WHEEZING: 0

## 2023-04-03 NOTE — PROGRESS NOTES
PROGRESS NOTE    SUBJECTIVE:   Shashi Garcia is a 41 y.o. male seen for a follow up visit regarding [unfilled]    29-year-old  male here for follow-up of chronic problems. He has testosterone deficiency and takes clomiphene, but has not been able to get it in the last few months because of cost.  He has elevated cholesterol and does not take any prescription cholesterol medication. He is also stopped exercising. He does take over-the-counter omega-3. Lab Results   Component Value Date    CHOL 231 (H) 09/28/2022    CHOL 274 (H) 06/17/2022    CHOL 247 (H) 12/14/2020     Lab Results   Component Value Date    TRIG 106 09/28/2022    TRIG 98 06/17/2022    TRIG 188 (H) 12/14/2020     Lab Results   Component Value Date    HDL 42 09/28/2022    HDL 48 06/17/2022    HDL 49 12/14/2020     Lab Results   Component Value Date    LDLCALC 167.8 (H) 09/28/2022    LDLCALC 206.4 (H) 06/17/2022    LDLCALC 163 (H) 12/14/2020     Lab Results   Component Value Date    LABVLDL 21.2 09/28/2022    LABVLDL 19.6 06/17/2022    LABVLDL 52 (H) 06/18/2020    VLDL 35 12/14/2020     Lab Results   Component Value Date    CHOLHDLRATIO 5.5 09/28/2022    CHOLHDLRATIO 5.7 06/17/2022     He has been thinking about the calcium score test and would like to have that. His grandfather passed away from a heart attack. He does not smoke. His blood pressure was a little elevated today. He does not check how much sodium is in his food. Past Medical History, Past Surgical History, Family history, Social History, and Medications were all reviewed with the patient today and updated as necessary. Current Outpatient Medications   Medication Sig Dispense Refill    Omega-3 Fatty Acids (FISH OIL PO) Take by mouth      Multiple Vitamin (MULTIVITAMIN PO) Take by mouth       No current facility-administered medications for this visit.      No Known Allergies  Patient Active Problem List   Diagnosis    Chronic eczematous otitis externa of both ears

## 2023-04-25 DIAGNOSIS — M62.830 MUSCLE SPASM OF BACK: Primary | ICD-10-CM

## 2023-07-12 RX ORDER — ESCITALOPRAM OXALATE 20 MG/1
20 TABLET ORAL DAILY
Qty: 30 TABLET | Refills: 0 | Status: SHIPPED | OUTPATIENT
Start: 2023-07-12

## 2023-08-16 ENCOUNTER — TELEMEDICINE (OUTPATIENT)
Dept: FAMILY MEDICINE CLINIC | Facility: CLINIC | Age: 48
End: 2023-08-16
Payer: COMMERCIAL

## 2023-08-16 DIAGNOSIS — F41.9 ANXIETY: Primary | ICD-10-CM

## 2023-08-16 PROCEDURE — 99213 OFFICE O/P EST LOW 20 MIN: CPT | Performed by: FAMILY MEDICINE

## 2023-08-16 RX ORDER — ESCITALOPRAM OXALATE 20 MG/1
20 TABLET ORAL DAILY
Qty: 90 TABLET | Refills: 1 | Status: SHIPPED | OUTPATIENT
Start: 2023-08-16

## 2023-08-16 ASSESSMENT — ENCOUNTER SYMPTOMS
DIARRHEA: 0
NAUSEA: 0
WHEEZING: 0
VOMITING: 0
SHORTNESS OF BREATH: 0
COUGH: 0
ABDOMINAL PAIN: 0

## 2023-08-16 NOTE — PROGRESS NOTES
Rosaura Hess (:  1975) is a Established patient, presenting virtually for evaluation of the following:    Assessment & Plan   Below is the assessment and plan developed based on review of pertinent history, physical exam, labs, studies, and medications. 1. Anxiety  -     escitalopram (LEXAPRO) 20 MG tablet; Take 1 tablet by mouth daily, Disp-90 tablet, R-1Normal  Given Lexapro 20 mg daily. No follow-ups on file. Subjective   29-year-old  male here for anxiety. Patient would like to start back on Lexapro 20 mg. He used to take it, but had quit for about 2 years. He has started a new job and it has been stressful. He would like to go back on the Lexapro, because he knows it works. It does cause ED, but he has tried Wellbutrin in the past and it made him \"crazy\". Review of Systems   Constitutional:  Negative for chills, fatigue and fever. Respiratory:  Negative for cough, shortness of breath and wheezing. Cardiovascular:  Negative for chest pain, palpitations and leg swelling. Gastrointestinal:  Negative for abdominal pain, diarrhea, nausea and vomiting. Genitourinary:  Negative for dysuria. Neurological:  Negative for dizziness and headaches. Psychiatric/Behavioral:  Negative for sleep disturbance. The patient is nervous/anxious. Objective   Patient-Reported Vitals  BP Observations: No, remote/electronic monitoring device was not used or able to be verified       Physical Exam  Constitutional:       Appearance: Normal appearance. HENT:      Head: Normocephalic. Neurological:      Mental Status: He is alert and oriented to person, place, and time.    Psychiatric:         Mood and Affect: Mood normal.     [INSTRUCTIONS:  \"[x]\" Indicates a positive item  \"[]\" Indicates a negative item  -- DELETE ALL ITEMS NOT EXAMINED]    Constitutional: [x] Appears well-developed and well-nourished [x] No apparent distress      [] Abnormal -     Mental status: [x] Alert

## 2024-03-07 DIAGNOSIS — F41.9 ANXIETY: ICD-10-CM

## 2024-03-10 RX ORDER — ESCITALOPRAM OXALATE 20 MG/1
20 TABLET ORAL DAILY
Qty: 90 TABLET | Refills: 1 | Status: SHIPPED | OUTPATIENT
Start: 2024-03-10

## 2024-04-08 DIAGNOSIS — F41.9 ANXIETY: ICD-10-CM

## 2024-04-08 RX ORDER — ESCITALOPRAM OXALATE 20 MG/1
20 TABLET ORAL DAILY
Qty: 90 TABLET | Refills: 1 | Status: SHIPPED | OUTPATIENT
Start: 2024-04-08

## 2024-04-10 DIAGNOSIS — E29.1 TESTOSTERONE DEFICIENCY IN MALE: Primary | ICD-10-CM

## 2024-04-10 DIAGNOSIS — E78.2 MIXED HYPERLIPIDEMIA: ICD-10-CM

## 2024-04-10 DIAGNOSIS — Z13.1 DIABETES MELLITUS SCREENING: ICD-10-CM

## 2024-04-10 DIAGNOSIS — R35.1 NOCTURIA: ICD-10-CM

## 2024-04-17 LAB
ALBUMIN SERPL-MCNC: 4.5 G/DL (ref 4.1–5.1)
ALBUMIN/GLOB SERPL: 1.9 {RATIO} (ref 1.2–2.2)
ALP SERPL-CCNC: 65 IU/L (ref 44–121)
ALT SERPL-CCNC: 21 IU/L (ref 0–44)
AST SERPL-CCNC: 21 IU/L (ref 0–40)
BASOPHILS # BLD AUTO: 0 X10E3/UL (ref 0–0.2)
BASOPHILS NFR BLD AUTO: 1 %
BILIRUB SERPL-MCNC: 0.5 MG/DL (ref 0–1.2)
BUN SERPL-MCNC: 9 MG/DL (ref 6–24)
BUN/CREAT SERPL: 9 (ref 9–20)
CALCIUM SERPL-MCNC: 9.6 MG/DL (ref 8.7–10.2)
CHLORIDE SERPL-SCNC: 102 MMOL/L (ref 96–106)
CHOLEST SERPL-MCNC: 295 MG/DL (ref 100–199)
CO2 SERPL-SCNC: 21 MMOL/L (ref 20–29)
CREAT SERPL-MCNC: 0.97 MG/DL (ref 0.76–1.27)
EGFRCR SERPLBLD CKD-EPI 2021: 98 ML/MIN/1.73
EOSINOPHIL # BLD AUTO: 0.2 X10E3/UL (ref 0–0.4)
EOSINOPHIL NFR BLD AUTO: 4 %
ERYTHROCYTE [DISTWIDTH] IN BLOOD BY AUTOMATED COUNT: 12.4 % (ref 11.6–15.4)
GLOBULIN SER CALC-MCNC: 2.4 G/DL (ref 1.5–4.5)
GLUCOSE SERPL-MCNC: 82 MG/DL (ref 70–99)
HCT VFR BLD AUTO: 42.7 % (ref 37.5–51)
HDLC SERPL-MCNC: 52 MG/DL
HGB BLD-MCNC: 14.3 G/DL (ref 13–17.7)
IMM GRANULOCYTES # BLD AUTO: 0 X10E3/UL (ref 0–0.1)
IMM GRANULOCYTES NFR BLD AUTO: 1 %
LABORATORY COMMENT REPORT: ABNORMAL
LDLC SERPL CALC-MCNC: 210 MG/DL (ref 0–99)
LYMPHOCYTES # BLD AUTO: 1.7 X10E3/UL (ref 0.7–3.1)
LYMPHOCYTES NFR BLD AUTO: 35 %
MCH RBC QN AUTO: 31.9 PG (ref 26.6–33)
MCHC RBC AUTO-ENTMCNC: 33.5 G/DL (ref 31.5–35.7)
MCV RBC AUTO: 95 FL (ref 79–97)
MONOCYTES # BLD AUTO: 0.7 X10E3/UL (ref 0.1–0.9)
MONOCYTES NFR BLD AUTO: 14 %
NEUTROPHILS # BLD AUTO: 2.4 X10E3/UL (ref 1.4–7)
NEUTROPHILS NFR BLD AUTO: 45 %
PLATELET # BLD AUTO: 217 X10E3/UL (ref 150–450)
POTASSIUM SERPL-SCNC: 4.5 MMOL/L (ref 3.5–5.2)
PROT SERPL-MCNC: 6.9 G/DL (ref 6–8.5)
PSA SERPL-MCNC: 0.8 NG/ML (ref 0–4)
RBC # BLD AUTO: 4.48 X10E6/UL (ref 4.14–5.8)
SODIUM SERPL-SCNC: 140 MMOL/L (ref 134–144)
TRIGL SERPL-MCNC: 175 MG/DL (ref 0–149)
VLDLC SERPL CALC-MCNC: 33 MG/DL (ref 5–40)
WBC # BLD AUTO: 5 X10E3/UL (ref 3.4–10.8)

## 2024-04-20 LAB
TESTOST FREE SERPL-MCNC: 9.3 PG/ML (ref 6.8–21.5)
TESTOST SERPL-MCNC: 197 NG/DL (ref 264–916)

## 2024-04-28 RX ORDER — ROSUVASTATIN CALCIUM 20 MG/1
20 TABLET, COATED ORAL NIGHTLY
Qty: 90 TABLET | Refills: 1 | Status: SHIPPED | OUTPATIENT
Start: 2024-04-28

## 2024-06-28 DIAGNOSIS — F41.9 ANXIETY: ICD-10-CM

## 2024-07-12 DIAGNOSIS — F41.9 ANXIETY: ICD-10-CM

## 2024-07-12 RX ORDER — ESCITALOPRAM OXALATE 20 MG/1
20 TABLET ORAL DAILY
Qty: 90 TABLET | Refills: 1 | Status: SHIPPED | OUTPATIENT
Start: 2024-07-12 | End: 2024-07-12 | Stop reason: SDUPTHER

## 2024-07-12 RX ORDER — ESCITALOPRAM OXALATE 20 MG/1
20 TABLET ORAL DAILY
Qty: 90 TABLET | Refills: 1 | Status: SHIPPED | OUTPATIENT
Start: 2024-07-12

## 2024-12-19 DIAGNOSIS — Z13.220 SCREENING CHOLESTEROL LEVEL: ICD-10-CM

## 2024-12-19 DIAGNOSIS — E29.1 TESTOSTERONE DEFICIENCY IN MALE: Primary | ICD-10-CM

## 2024-12-19 DIAGNOSIS — Z13.1 DIABETES MELLITUS SCREENING: ICD-10-CM

## 2024-12-31 LAB
ALBUMIN SERPL-MCNC: 4.4 G/DL (ref 4.1–5.1)
ALP SERPL-CCNC: 56 IU/L (ref 44–121)
ALT SERPL-CCNC: 28 IU/L (ref 0–44)
AST SERPL-CCNC: 30 IU/L (ref 0–40)
BASOPHILS # BLD AUTO: 0 X10E3/UL (ref 0–0.2)
BASOPHILS NFR BLD AUTO: 1 %
BILIRUB SERPL-MCNC: 0.5 MG/DL (ref 0–1.2)
BUN SERPL-MCNC: 17 MG/DL (ref 6–24)
BUN/CREAT SERPL: 16 (ref 9–20)
CALCIUM SERPL-MCNC: 9.4 MG/DL (ref 8.7–10.2)
CHLORIDE SERPL-SCNC: 101 MMOL/L (ref 96–106)
CHOLEST SERPL-MCNC: 217 MG/DL (ref 100–199)
CO2 SERPL-SCNC: 22 MMOL/L (ref 20–29)
CREAT SERPL-MCNC: 1.08 MG/DL (ref 0.76–1.27)
EGFRCR SERPLBLD CKD-EPI 2021: 85 ML/MIN/1.73
EOSINOPHIL # BLD AUTO: 0.1 X10E3/UL (ref 0–0.4)
EOSINOPHIL NFR BLD AUTO: 2 %
ERYTHROCYTE [DISTWIDTH] IN BLOOD BY AUTOMATED COUNT: 12.7 % (ref 11.6–15.4)
ESTRADIOL SERPL-MCNC: 25 PG/ML (ref 7.6–42.6)
FSH SERPL-ACNC: 5.1 MIU/ML (ref 1.5–12.4)
GLOBULIN SER CALC-MCNC: 2.6 G/DL (ref 1.5–4.5)
GLUCOSE SERPL-MCNC: 78 MG/DL (ref 70–99)
HCT VFR BLD AUTO: 44.8 % (ref 37.5–51)
HDLC SERPL-MCNC: 39 MG/DL
HGB BLD-MCNC: 14.6 G/DL (ref 13–17.7)
IMM GRANULOCYTES # BLD AUTO: 0 X10E3/UL (ref 0–0.1)
IMM GRANULOCYTES NFR BLD AUTO: 0 %
LDLC SERPL CALC-MCNC: 120 MG/DL (ref 0–99)
LH SERPL-ACNC: 5.2 MIU/ML (ref 1.7–8.6)
LYMPHOCYTES # BLD AUTO: 1.6 X10E3/UL (ref 0.7–3.1)
LYMPHOCYTES NFR BLD AUTO: 29 %
MCH RBC QN AUTO: 30.8 PG (ref 26.6–33)
MCHC RBC AUTO-ENTMCNC: 32.6 G/DL (ref 31.5–35.7)
MCV RBC AUTO: 95 FL (ref 79–97)
MONOCYTES # BLD AUTO: 0.5 X10E3/UL (ref 0.1–0.9)
MONOCYTES NFR BLD AUTO: 9 %
NEUTROPHILS # BLD AUTO: 3.2 X10E3/UL (ref 1.4–7)
NEUTROPHILS NFR BLD AUTO: 59 %
PLATELET # BLD AUTO: 183 X10E3/UL (ref 150–450)
POTASSIUM SERPL-SCNC: 4.5 MMOL/L (ref 3.5–5.2)
PROT SERPL-MCNC: 7 G/DL (ref 6–8.5)
RBC # BLD AUTO: 4.74 X10E6/UL (ref 4.14–5.8)
SODIUM SERPL-SCNC: 138 MMOL/L (ref 134–144)
TESTOST FREE SERPL-MCNC: 22.1 PG/ML (ref 6.8–21.5)
TESTOST SERPL-MCNC: 420 NG/DL (ref 264–916)
TRIGL SERPL-MCNC: 330 MG/DL (ref 0–149)
VLDLC SERPL CALC-MCNC: 58 MG/DL (ref 5–40)
WBC # BLD AUTO: 5.4 X10E3/UL (ref 3.4–10.8)

## 2025-01-28 ENCOUNTER — OFFICE VISIT (OUTPATIENT)
Dept: FAMILY MEDICINE CLINIC | Facility: CLINIC | Age: 50
End: 2025-01-28

## 2025-01-28 VITALS
BODY MASS INDEX: 31.64 KG/M2 | SYSTOLIC BLOOD PRESSURE: 134 MMHG | DIASTOLIC BLOOD PRESSURE: 80 MMHG | HEIGHT: 71 IN | RESPIRATION RATE: 16 BRPM | WEIGHT: 226 LBS

## 2025-01-28 DIAGNOSIS — E78.2 MIXED HYPERLIPIDEMIA: ICD-10-CM

## 2025-01-28 DIAGNOSIS — Z12.11 COLON CANCER SCREENING: ICD-10-CM

## 2025-01-28 DIAGNOSIS — E29.1 TESTOSTERONE DEFICIENCY IN MALE: Primary | ICD-10-CM

## 2025-01-28 RX ORDER — ROSUVASTATIN CALCIUM 20 MG/1
20 TABLET, COATED ORAL NIGHTLY
Qty: 90 TABLET | Refills: 1 | Status: SHIPPED | OUTPATIENT
Start: 2025-01-28

## 2025-01-28 SDOH — ECONOMIC STABILITY: FOOD INSECURITY: WITHIN THE PAST 12 MONTHS, YOU WORRIED THAT YOUR FOOD WOULD RUN OUT BEFORE YOU GOT MONEY TO BUY MORE.: NEVER TRUE

## 2025-01-28 SDOH — ECONOMIC STABILITY: FOOD INSECURITY: WITHIN THE PAST 12 MONTHS, THE FOOD YOU BOUGHT JUST DIDN'T LAST AND YOU DIDN'T HAVE MONEY TO GET MORE.: NEVER TRUE

## 2025-01-28 ASSESSMENT — PATIENT HEALTH QUESTIONNAIRE - PHQ9
SUM OF ALL RESPONSES TO PHQ QUESTIONS 1-9: 0
SUM OF ALL RESPONSES TO PHQ QUESTIONS 1-9: 0
10. IF YOU CHECKED OFF ANY PROBLEMS, HOW DIFFICULT HAVE THESE PROBLEMS MADE IT FOR YOU TO DO YOUR WORK, TAKE CARE OF THINGS AT HOME, OR GET ALONG WITH OTHER PEOPLE: NOT DIFFICULT AT ALL
5. POOR APPETITE OR OVEREATING: NOT AT ALL
1. LITTLE INTEREST OR PLEASURE IN DOING THINGS: NOT AT ALL
6. FEELING BAD ABOUT YOURSELF - OR THAT YOU ARE A FAILURE OR HAVE LET YOURSELF OR YOUR FAMILY DOWN: NOT AT ALL
7. TROUBLE CONCENTRATING ON THINGS, SUCH AS READING THE NEWSPAPER OR WATCHING TELEVISION: NOT AT ALL
SUM OF ALL RESPONSES TO PHQ9 QUESTIONS 1 & 2: 0
9. THOUGHTS THAT YOU WOULD BE BETTER OFF DEAD, OR OF HURTING YOURSELF: NOT AT ALL
8. MOVING OR SPEAKING SO SLOWLY THAT OTHER PEOPLE COULD HAVE NOTICED. OR THE OPPOSITE, BEING SO FIGETY OR RESTLESS THAT YOU HAVE BEEN MOVING AROUND A LOT MORE THAN USUAL: NOT AT ALL
3. TROUBLE FALLING OR STAYING ASLEEP: NOT AT ALL
4. FEELING TIRED OR HAVING LITTLE ENERGY: NOT AT ALL
SUM OF ALL RESPONSES TO PHQ QUESTIONS 1-9: 0
2. FEELING DOWN, DEPRESSED OR HOPELESS: NOT AT ALL
SUM OF ALL RESPONSES TO PHQ QUESTIONS 1-9: 0

## 2025-01-28 ASSESSMENT — ENCOUNTER SYMPTOMS
DIARRHEA: 0
ABDOMINAL PAIN: 0
SHORTNESS OF BREATH: 0
NAUSEA: 0
WHEEZING: 0
COUGH: 0
VOMITING: 0

## 2025-01-28 NOTE — PROGRESS NOTES
PROGRESS NOTE    SUBJECTIVE:   David Farrell is a 49 y.o. male seen for a follow up visit regarding follow-up of chronic problems.  Patient has testosterone deficiency and has had it since his 30s.  He had an MRI of his pituitary gland by endocrinology, but it was normal.  He has been on Clomid 50 mg daily for several years, but it is expensive.  He was on testosterone for a while, but could not take it because it made him have range.  He was not consistent in his injections.   He has hyperlipidemia his supposed to be taking Crestor 20 mg daily, but he has not been taking it aches over the last couple weeks.  He is due for colonoscopy.        Past Medical History, Past Surgical History, Family history, Social History, and Medications were all reviewed with the patient today and updated as necessary.       Current Outpatient Medications   Medication Sig Dispense Refill   • rosuvastatin (CRESTOR) 20 MG tablet Take 1 tablet by mouth nightly 90 tablet 1   • clomiPHENE (CLOMID) 50 MG tablet Take 1 tablet by mouth daily 90 tablet 1   • Omega-3 Fatty Acids (FISH OIL PO) Take by mouth     • Multiple Vitamin (MULTIVITAMIN PO) Take by mouth       No current facility-administered medications for this visit.     No Known Allergies  Patient Active Problem List   Diagnosis   • Chronic eczematous otitis externa of both ears   • CHADWICK (obstructive sleep apnea)   • Refused influenza vaccine   • Hypogonadism in male   • Hypercholesterolemia   • Gastroesophageal reflux disease without esophagitis   • Hypogonadotropic hypogonadism (HCC)   • Obstructive sleep apnea on CPAP   • Mild single current episode of major depressive disorder (HCC)   • Inadequate sleep hygiene   • High risk medication use   • Heavy alcohol consumption   • Left elbow pain   • Hyperprolactinemia (HCC)   • Drug-induced erectile dysfunction   • Acquired deformity of left elbow   • Class 1 obesity due to excess calories without serious comorbidity with body mass

## 2025-01-29 ENCOUNTER — PATIENT MESSAGE (OUTPATIENT)
Age: 50
End: 2025-01-29

## 2025-01-29 ENCOUNTER — PREP FOR PROCEDURE (OUTPATIENT)
Age: 50
End: 2025-01-29

## 2025-01-29 DIAGNOSIS — Z12.11 ENCOUNTER FOR SCREENING COLONOSCOPY: ICD-10-CM

## 2025-02-03 RX ORDER — SODIUM CHLORIDE 9 MG/ML
25 INJECTION, SOLUTION INTRAVENOUS PRN
Status: CANCELLED | OUTPATIENT
Start: 2025-02-03

## 2025-02-03 RX ORDER — SODIUM CHLORIDE 0.9 % (FLUSH) 0.9 %
5-40 SYRINGE (ML) INJECTION PRN
Status: CANCELLED | OUTPATIENT
Start: 2025-02-03

## 2025-02-03 RX ORDER — SODIUM CHLORIDE 0.9 % (FLUSH) 0.9 %
5-40 SYRINGE (ML) INJECTION EVERY 12 HOURS SCHEDULED
Status: CANCELLED | OUTPATIENT
Start: 2025-02-03

## 2025-02-20 NOTE — PERIOP NOTE
Patient verified name, , and procedure.    Type: 1a; abbreviated assessment per anesthesia guidelines    Labs per anesthesia: None    Instructed pt that they will be notified the day before their procedure by the GI Lab for time of arrival if their procedure is [place] and Pre-op for [place] cases. Arrival times should be called by 5 pm. If no phone is received the patient should contact their respective hospital. The GI lab telephone number is [phone] and ES Pre-op is [phone].     Follow diet and prep instructions per office including NPO status.      Bath or shower the night before and the am of surgery with non-moisturizing soap. No lotions, oils, powders, cologne on skin. No make up, eye make up or jewelry. Wear loose fitting comfortable, clean clothing.     Must have adult present in building the entire time .     Medications for the day of procedure None    Please hold all vitamins x 7 days prior to procedure and NSAIDS (Aspirin, Excedrin, Goody powders, Motrin, Ibuprofen, Advil, Aleve and Naproxen) x 5 days prior to procedure. Should you have a procedure date that does not allow for the amount of time instructed above, please stop taking vitamins, supplements, and NSAIDS IMMEDIATELY.       The following discharge instructions reviewed with patient: medication given during procedure may cause drowsiness for several hours, therefore, do not drive or operate machinery for remainder of the day. You may not drink alcohol on the day of your procedure, please resume regular diet and activity unless otherwise directed. You may experience abdominal distention for several hours that is relieved by the passage of gas. Contact your physician if you have any of the following: fever or chills, severe abdominal pain or excessive amount of bleeding or a large amount when having a bowel movement. Occasional specks of blood with bowel movement would not be unusual.

## 2025-02-24 RX ORDER — NALOXONE HYDROCHLORIDE 0.4 MG/ML
INJECTION, SOLUTION INTRAMUSCULAR; INTRAVENOUS; SUBCUTANEOUS PRN
Status: CANCELLED | OUTPATIENT
Start: 2025-02-24

## 2025-02-25 ENCOUNTER — ANESTHESIA (OUTPATIENT)
Dept: ENDOSCOPY | Age: 50
End: 2025-02-25
Payer: COMMERCIAL

## 2025-02-25 ENCOUNTER — HOSPITAL ENCOUNTER (OUTPATIENT)
Age: 50
Discharge: HOME | End: 2025-02-25
Attending: STUDENT IN AN ORGANIZED HEALTH CARE EDUCATION/TRAINING PROGRAM | Admitting: STUDENT IN AN ORGANIZED HEALTH CARE EDUCATION/TRAINING PROGRAM
Payer: COMMERCIAL

## 2025-02-25 ENCOUNTER — ANESTHESIA EVENT (OUTPATIENT)
Dept: ENDOSCOPY | Age: 50
End: 2025-02-25
Payer: COMMERCIAL

## 2025-02-25 VITALS
HEART RATE: 68 BPM | SYSTOLIC BLOOD PRESSURE: 133 MMHG | OXYGEN SATURATION: 97 % | TEMPERATURE: 98 F | BODY MASS INDEX: 30.8 KG/M2 | RESPIRATION RATE: 15 BRPM | HEIGHT: 71 IN | DIASTOLIC BLOOD PRESSURE: 92 MMHG | WEIGHT: 220 LBS

## 2025-02-25 DIAGNOSIS — Z12.11 ENCOUNTER FOR SCREENING COLONOSCOPY: ICD-10-CM

## 2025-02-25 PROCEDURE — 3700000001 HC ADD 15 MINUTES (ANESTHESIA): Performed by: STUDENT IN AN ORGANIZED HEALTH CARE EDUCATION/TRAINING PROGRAM

## 2025-02-25 PROCEDURE — 3700000000 HC ANESTHESIA ATTENDED CARE: Performed by: STUDENT IN AN ORGANIZED HEALTH CARE EDUCATION/TRAINING PROGRAM

## 2025-02-25 PROCEDURE — 7100000010 HC PHASE II RECOVERY - FIRST 15 MIN: Performed by: STUDENT IN AN ORGANIZED HEALTH CARE EDUCATION/TRAINING PROGRAM

## 2025-02-25 PROCEDURE — 2580000003 HC RX 258: Performed by: ANESTHESIOLOGY

## 2025-02-25 PROCEDURE — 7100000011 HC PHASE II RECOVERY - ADDTL 15 MIN: Performed by: STUDENT IN AN ORGANIZED HEALTH CARE EDUCATION/TRAINING PROGRAM

## 2025-02-25 PROCEDURE — 2709999900 HC NON-CHARGEABLE SUPPLY: Performed by: STUDENT IN AN ORGANIZED HEALTH CARE EDUCATION/TRAINING PROGRAM

## 2025-02-25 PROCEDURE — 88305 TISSUE EXAM BY PATHOLOGIST: CPT

## 2025-02-25 PROCEDURE — 6360000002 HC RX W HCPCS: Performed by: STUDENT IN AN ORGANIZED HEALTH CARE EDUCATION/TRAINING PROGRAM

## 2025-02-25 PROCEDURE — 3609010600 HC COLONOSCOPY POLYPECTOMY SNARE/COLD BIOPSY: Performed by: STUDENT IN AN ORGANIZED HEALTH CARE EDUCATION/TRAINING PROGRAM

## 2025-02-25 RX ORDER — SODIUM CHLORIDE, SODIUM LACTATE, POTASSIUM CHLORIDE, CALCIUM CHLORIDE 600; 310; 30; 20 MG/100ML; MG/100ML; MG/100ML; MG/100ML
INJECTION, SOLUTION INTRAVENOUS CONTINUOUS
Status: DISCONTINUED | OUTPATIENT
Start: 2025-02-25 | End: 2025-02-25 | Stop reason: HOSPADM

## 2025-02-25 RX ORDER — LIDOCAINE HYDROCHLORIDE 20 MG/ML
INJECTION, SOLUTION EPIDURAL; INFILTRATION; INTRACAUDAL; PERINEURAL
Status: DISCONTINUED | OUTPATIENT
Start: 2025-02-25 | End: 2025-02-25 | Stop reason: SDUPTHER

## 2025-02-25 RX ORDER — SODIUM CHLORIDE 9 MG/ML
INJECTION, SOLUTION INTRAVENOUS PRN
Status: DISCONTINUED | OUTPATIENT
Start: 2025-02-25 | End: 2025-02-25 | Stop reason: HOSPADM

## 2025-02-25 RX ORDER — SODIUM CHLORIDE 0.9 % (FLUSH) 0.9 %
5-40 SYRINGE (ML) INJECTION EVERY 12 HOURS SCHEDULED
Status: DISCONTINUED | OUTPATIENT
Start: 2025-02-25 | End: 2025-02-25 | Stop reason: HOSPADM

## 2025-02-25 RX ORDER — SODIUM CHLORIDE 9 MG/ML
25 INJECTION, SOLUTION INTRAVENOUS PRN
Status: DISCONTINUED | OUTPATIENT
Start: 2025-02-25 | End: 2025-02-25 | Stop reason: HOSPADM

## 2025-02-25 RX ORDER — LIDOCAINE HYDROCHLORIDE 10 MG/ML
1 INJECTION, SOLUTION INFILTRATION; PERINEURAL
Status: DISCONTINUED | OUTPATIENT
Start: 2025-02-25 | End: 2025-02-25 | Stop reason: HOSPADM

## 2025-02-25 RX ORDER — SODIUM CHLORIDE 0.9 % (FLUSH) 0.9 %
5-40 SYRINGE (ML) INJECTION PRN
Status: DISCONTINUED | OUTPATIENT
Start: 2025-02-25 | End: 2025-02-25 | Stop reason: HOSPADM

## 2025-02-25 RX ORDER — PROPOFOL 10 MG/ML
INJECTION, EMULSION INTRAVENOUS
Status: DISCONTINUED | OUTPATIENT
Start: 2025-02-25 | End: 2025-02-25 | Stop reason: SDUPTHER

## 2025-02-25 RX ADMIN — SODIUM CHLORIDE, SODIUM LACTATE, POTASSIUM CHLORIDE, AND CALCIUM CHLORIDE: 600; 310; 30; 20 INJECTION, SOLUTION INTRAVENOUS at 13:34

## 2025-02-25 RX ADMIN — PROPOFOL 70 MG: 10 INJECTION, EMULSION INTRAVENOUS at 13:37

## 2025-02-25 RX ADMIN — LIDOCAINE HYDROCHLORIDE 50 MG: 20 INJECTION, SOLUTION EPIDURAL; INFILTRATION; INTRACAUDAL; PERINEURAL at 13:37

## 2025-02-25 RX ADMIN — PROPOFOL 200 MCG/KG/MIN: 10 INJECTION, EMULSION INTRAVENOUS at 13:38

## 2025-02-25 ASSESSMENT — PAIN - FUNCTIONAL ASSESSMENT: PAIN_FUNCTIONAL_ASSESSMENT: 0-10

## 2025-02-25 NOTE — ANESTHESIA POSTPROCEDURE EVALUATION
Department of Anesthesiology  Postprocedure Note    Patient: David Farrell  MRN: [id number]  YOB: 1975  Date of evaluation: 2/25/2025    Procedure Summary       Date: 02/25/25 Room / Location: [facility] ENDO 04 / [facility] ENDOSCOPY    Anesthesia Start: 1334 Anesthesia Stop: 1358    Procedure: COLONOSCOPY POLYPECTOMY SNARE Diagnosis:       Encounter for screening colonoscopy      (Encounter for screening colonoscopy [Z12.11])    Surgeons: Raphael Martinez MD Responsible Provider: Will Benitez MD    Anesthesia Type: TIVA ASA Status: 2            Anesthesia Type: No value filed.    Beatriz Phase I: Beatriz Score: 10    Beatriz Phase II: Beatriz Score: 10    Anesthesia Post Evaluation    Patient location during evaluation: PACU  Patient participation: complete - patient participated  Level of consciousness: awake and alert  Airway patency: patent  Nausea & Vomiting: no nausea and no vomiting  Cardiovascular status: hemodynamically stable  Respiratory status: acceptable, nonlabored ventilation and spontaneous ventilation  Hydration status: euvolemic  Comments: /74   Pulse 68   Temp 98 °F (36.7 °C) (Skin)   Resp 20   Ht 1.803 m (5' 11\")   Wt 99.8 kg (220 lb)   SpO2 96%   BMI 30.68 kg/m²     Multimodal analgesia pain management approach  Pain management: adequate and satisfactory to patient    No notable events documented.

## 2025-02-25 NOTE — H&P
David Farrell is 49 y.o. y/o male here for screening colonoscopy.    No immediate (parents/siblings) FH of colon cancer, no acute symptoms.     Past Medical History:   Diagnosis Date    GERD (gastroesophageal reflux disease)     Hypercholesterolemia     Hyperinsulinemia     Hyperprolactinemia     Hypogonadotropic hypogonadism     Major depression     Obstructive sleep apnea on CPAP     Osteoarthritis      Past Surgical History:   Procedure Laterality Date    FRACTURE SURGERY      HX OPEN REDUCTION INTERNAL FIXATION      left arm    ORTHOPEDIC SURGERY  2020    elbow arthroscopy     Family History   Problem Relation Age of Onset    Other Father         hypogonadism    Prostate Cancer Father     Alzheimer's Disease Mother      Social History     Tobacco Use    Smoking status: Former     Current packs/day: 0.00     Average packs/day: 0.3 packs/day for 5.0 years (1.3 ttl pk-yrs)     Types: Cigarettes     Start date: 1995     Quit date: 2000     Years since quittin.5    Smokeless tobacco: Never   Substance Use Topics    Alcohol use: Yes     Alcohol/week: 10.0 standard drinks of alcohol     Types: 10 Cans of beer per week    Drug use: Never     No Known Allergies  Current Outpatient Medications   Medication Instructions    clomiPHENE (CLOMID) 50 mg, Oral, DAILY    Multiple Vitamin (MULTIVITAMIN PO) Oral    Omega-3 Fatty Acids (FISH OIL PO) Oral    rosuvastatin (CRESTOR) 20 mg, Oral, NIGHTLY     Review of Systems    ROS:    A complete 11 system ROS was performed and was negative aside from the pertinent negative and positives noted above.     PE:   There were no vitals filed for this visit.   General:  The patient appears well-nourished, and is in no acute distress.    Skin:  no rash, ulcers. No Bleeding or signs of infection.  HEENT:  Normocephalic, atraumatic. No sclerae icterus.   Neck:  No pain on palpation or mobilization of the neck.  Respiratory: Respiratory effort is normal. Expansion

## 2025-02-25 NOTE — DISCHARGE INSTRUCTIONS
Gastrointestinal Colonoscopy/Flexible Sigmoidoscopy - Lower Exam Discharge Instructions  Call Dr. Martinez @ [phone] for any problems or questions.  Contact the doctor’s office for follow up appointment as directed  Medication may cause drowsiness for several hours, therefore, do not drive or operate machinery for remainder of the day.  No alcohol today.  Do not make any important decisions such signing legal paperwork.  Ordinarily, you may resume regular diet and activity after exam unless otherwise specified by your physician.  Because of air put into your colon during exam, you may experience some abdominal distension, relieved by the passage of gas, for several hours.  Contact your physician if you have any of the following:  Excessive amount of bleeding - large amount when having a bowel movement.  Occasional specks of blood with bowel movement would not be unusual.  Severe abdominal pain  Fever or Chills  Polyp Removal - follow these additional instructions  Take Metamucil - 1 tablespoon in juice every morning for 3 days, if needed.  No Aspirin, Advil, Aleve, Nuprin, Ibuprofen, or medications that contain these drugs for 2 weeks, unless taken for a heart condition.    Any additional instructions:   ***        Instructions given to David Farrell and other family members.

## 2025-02-25 NOTE — ANESTHESIA PRE PROCEDURE
Department of Anesthesiology  Preprocedure Note       Name:  David Farrell   Age:  49 y.o.  :  1975                                          MRN:  [id number]         Date:  2025      Surgeon: Surgeon(s):  Raphael Martinez MD    Procedure: Procedure(s):  COLORECTAL CANCER SCREENING, NOT HIGH RISK *coming early, on the way    Medications prior to admission:   Prior to Admission medications    Medication Sig Start Date End Date Taking? Authorizing Provider   rosuvastatin (CRESTOR) 20 MG tablet Take 1 tablet by mouth nightly 25   Audrey Soler DO   clomiPHENE (CLOMID) 50 MG tablet Take 1 tablet by mouth daily 24   Audrey Soler DO   Omega-3 Fatty Acids (FISH OIL PO) Take by mouth    ProviderDennys MD   Multiple Vitamin (MULTIVITAMIN PO) Take by mouth    ProviderDennys MD       Current medications:    Current Facility-Administered Medications   Medication Dose Route Frequency Provider Last Rate Last Admin   • lidocaine 1 % injection 1 mL  1 mL IntraDERmal Once PRN Will Benitez MD       • lactated ringers infusion   IntraVENous Continuous iWll Benitez MD       • sodium chloride flush 0.9 % injection 5-40 mL  5-40 mL IntraVENous 2 times per day Will Benitez MD       • sodium chloride flush 0.9 % injection 5-40 mL  5-40 mL IntraVENous PRN Will Benitez MD       • 0.9 % sodium chloride infusion   IntraVENous PRN Will Benitez MD       • sodium chloride flush 0.9 % injection 5-40 mL  5-40 mL IntraVENous 2 times per day Raphael Martinez MD       • sodium chloride flush 0.9 % injection 5-40 mL  5-40 mL IntraVENous PRN Raphael Martinez MD       • 0.9 % sodium chloride infusion  25 mL IntraVENous PRN Raphael Martinez MD           Allergies:  No Known Allergies    Problem List:    Patient Active Problem List   Diagnosis Code   • Chronic eczematous otitis externa of both ears H60.8X3   • CHADWICK (obstructive sleep apnea) G47.33   • Refused influenza vaccine Z28.21   • Hypogonadism

## 2025-02-28 PROBLEM — Z12.11 ENCOUNTER FOR SCREENING COLONOSCOPY: Status: RESOLVED | Noted: 2025-01-29 | Resolved: 2025-02-28

## 2025-03-11 ENCOUNTER — RESULTS FOLLOW-UP (OUTPATIENT)
Dept: ENDOSCOPY | Age: 50
End: 2025-03-11

## 2025-04-02 DIAGNOSIS — E78.2 MIXED HYPERLIPIDEMIA: ICD-10-CM

## 2025-04-02 RX ORDER — ROSUVASTATIN CALCIUM 20 MG/1
20 TABLET, COATED ORAL NIGHTLY
Qty: 90 TABLET | Refills: 1 | OUTPATIENT
Start: 2025-04-02

## 2025-04-05 DIAGNOSIS — E78.2 MIXED HYPERLIPIDEMIA: ICD-10-CM

## 2025-04-05 RX ORDER — ROSUVASTATIN CALCIUM 20 MG/1
20 TABLET, COATED ORAL NIGHTLY
Qty: 90 TABLET | Refills: 1 | Status: SHIPPED | OUTPATIENT
Start: 2025-04-05

## 2025-04-05 RX ORDER — BUPROPION HYDROCHLORIDE 150 MG/1
150 TABLET ORAL EVERY MORNING
Qty: 30 TABLET | Refills: 1 | Status: SHIPPED | OUTPATIENT
Start: 2025-04-05

## 2025-04-30 DIAGNOSIS — Z30.09 VASECTOMY EVALUATION: Primary | ICD-10-CM

## 2025-05-14 ENCOUNTER — PATIENT MESSAGE (OUTPATIENT)
Dept: FAMILY MEDICINE CLINIC | Facility: CLINIC | Age: 50
End: 2025-05-14

## 2025-06-12 ENCOUNTER — PATIENT MESSAGE (OUTPATIENT)
Dept: FAMILY MEDICINE CLINIC | Facility: CLINIC | Age: 50
End: 2025-06-12

## 2025-06-12 RX ORDER — BUPROPION HYDROCHLORIDE 150 MG/1
150 TABLET, EXTENDED RELEASE ORAL EVERY MORNING
Qty: 30 TABLET | Refills: 0 | OUTPATIENT
Start: 2025-06-12

## 2025-06-12 RX ORDER — BUPROPION HYDROCHLORIDE 150 MG/1
150 TABLET ORAL EVERY MORNING
Qty: 90 TABLET | Refills: 1 | Status: SHIPPED | OUTPATIENT
Start: 2025-06-12 | End: 2025-12-09

## 2025-06-25 DIAGNOSIS — F41.9 ANXIETY: Primary | ICD-10-CM

## 2025-06-25 NOTE — TELEPHONE ENCOUNTER
Pharmacy called and states that they received a prescription for VORTIoxetine (TRINTELLIX) 5 MG tablet two tablets once daily. States that insurance will only cover 1 tablet daily. Needs prescription sent over as 10 mg tablet taken once daily. Pended for approval. Please advise.

## (undated) DEVICE — BNDG ELAS ESMARK 4INX12FT LF -- STRL

## (undated) DEVICE — ENDOSCOPIC KIT 1.1+ OP4 CA DE 2 GWN AAMI LEVEL 3

## (undated) DEVICE — 3M™ COBAN™ SELF-ADHERENT WRAP, 1586S, STERILE, 6 IN X 5 YD (15 CM X 4,5 M), 12 ROLLS/CASE: Brand: 3M™ COBAN™

## (undated) DEVICE — DRAPE TWL SURG 16X26IN BLU ORB04] ALLCARE INC]

## (undated) DEVICE — TUBING, SUCTION, 1/4" X 10', STRAIGHT: Brand: MEDLINE

## (undated) DEVICE — CONNECTOR TBNG OD5-7MM O2 END DISP

## (undated) DEVICE — DISPOSABLE BIPOLAR CODE, 12' (3.66 M): Brand: CONMED

## (undated) DEVICE — SINGLE PORT MANIFOLD: Brand: NEPTUNE 2

## (undated) DEVICE — NEEDLE SYRINGE 18GA L1.5IN RED PLAS HUB S STL BLNT FILL W/O

## (undated) DEVICE — SYRINGE MED 10ML LUERLOCK TIP W/O SFTY DISP

## (undated) DEVICE — CARDINAL HEALTH FLEXIBLE LIGHT HANDLE COVER: Brand: CARDINAL HEALTH

## (undated) DEVICE — SUTURE VCRL RAPIDE SZ 4-0 L18IN ABSRB UD L19MM PS-2 1/2 CIR VR496

## (undated) DEVICE — AIRLIFE™ OXYGEN TUBING 7 FEET (2.1 M) CRUSH RESISTANT OXYGEN TUBING, VINYL TIPPED: Brand: AIRLIFE™

## (undated) DEVICE — SUTURE MCRYL SZ 4-0 L27IN ABSRB UD L19MM PS-2 1/2 CIR PRIM Y426H

## (undated) DEVICE — PADDING CAST W4INXL4YD ST COT COHESIVE HND TEARABLE SPEC

## (undated) DEVICE — (D)PREP SKN CHLRAPRP APPL 26ML -- CONVERT TO ITEM 371833

## (undated) DEVICE — HAND PACK: Brand: MEDLINE INDUSTRIES, INC.

## (undated) DEVICE — GARMENT,MEDLINE,DVT,INT,CALF,MED, GEN2: Brand: MEDLINE

## (undated) DEVICE — SYRINGE MEDICAL 3ML CLEAR PLASTIC STANDARD NON CONTROL LUERLOCK TIP DISPOSABLE

## (undated) DEVICE — LUBE JELLY FOIL PACK 1.4 OZ: Brand: MEDLINE INDUSTRIES, INC.

## (undated) DEVICE — WATERPROOF, BACTERIA PROOF DRESSING WITH ABSORBENT SEE THROUGH PAD: Brand: OPSITE POST-OP VISIBLE 15X10CM CTN 20

## (undated) DEVICE — STOCKINETTE ORTH W9XL36IN COT 2 PLY HLLW FOR HANDLING LMB

## (undated) DEVICE — GAUZE,SPONGE,4"X4",12PLY,WOVEN,NS,LF: Brand: MEDLINE

## (undated) DEVICE — KENDALL RADIOLUCENT FOAM MONITORING ELECTRODE RECTANGULAR SHAPE: Brand: KENDALL

## (undated) DEVICE — BNDG,ELSTC,MATRIX,STRL,4"X5YD,LF,HOOK&LP: Brand: MEDLINE

## (undated) DEVICE — DISPOSABLE BIOPSY VALVE MAJ-1555: Brand: SINGLE USE BIOPSY VALVE (STERILE)

## (undated) DEVICE — SUTURE PDS II SZ 2-0 L27IN ABSRB VLT L26MM CT-2 1/2 CIR Z333H

## (undated) DEVICE — SOLUTION IV 1000ML 0.9% SOD CHL

## (undated) DEVICE — AMD ANTIMICROBIAL BANDAGE ROLL,6 PLY: Brand: KERLIX

## (undated) DEVICE — SNARE ENDOSCP POLYP 2.4 MM 240 CM 10 MM 2.8 MM CAPTIVATOR

## (undated) DEVICE — CONTAINER FORMALIN PREFILLED 10% NBF 60ML

## (undated) DEVICE — ZIMMER® STERILE DISPOSABLE TOURNIQUET CUFF WITH PLC, DUAL PORT, SINGLE BLADDER, 18 IN. (46 CM)

## (undated) DEVICE — TRAP SPEC POLYP REM STRNR CLN DSGN MAGNIFYING WIND DISP